# Patient Record
Sex: FEMALE | Race: WHITE | NOT HISPANIC OR LATINO | Employment: FULL TIME | URBAN - METROPOLITAN AREA
[De-identification: names, ages, dates, MRNs, and addresses within clinical notes are randomized per-mention and may not be internally consistent; named-entity substitution may affect disease eponyms.]

---

## 2019-01-01 ENCOUNTER — HOSPITAL ENCOUNTER (OUTPATIENT)
Facility: HOSPITAL | Age: 44
Discharge: HOME | End: 2019-01-04
Attending: FAMILY MEDICINE | Admitting: INTERNAL MEDICINE
Payer: COMMERCIAL

## 2019-01-01 DIAGNOSIS — I48.91 ATRIAL FIBRILLATION: ICD-10-CM

## 2019-01-01 DIAGNOSIS — E87.6 HYPOKALEMIA: ICD-10-CM

## 2019-01-01 DIAGNOSIS — R07.9 CHEST PAIN: ICD-10-CM

## 2019-01-01 DIAGNOSIS — R07.9 CHEST PAIN, UNSPECIFIED TYPE: Primary | ICD-10-CM

## 2019-01-01 LAB
ALBUMIN SERPL BCP-MCNC: 4.3 G/DL
ALP SERPL-CCNC: 94 U/L
ALT SERPL W/O P-5'-P-CCNC: 24 U/L
ANION GAP SERPL CALC-SCNC: 9 MMOL/L
AST SERPL-CCNC: 22 U/L
B-HCG UR QL: NEGATIVE
BASOPHILS # BLD AUTO: 0.06 K/UL
BASOPHILS NFR BLD: 0.7 %
BILIRUB SERPL-MCNC: 0.5 MG/DL
BUN SERPL-MCNC: 18 MG/DL
CALCIUM SERPL-MCNC: 9 MG/DL
CHLORIDE SERPL-SCNC: 101 MMOL/L
CO2 SERPL-SCNC: 25 MMOL/L
CREAT SERPL-MCNC: 1.4 MG/DL
D DIMER PPP FEU-MCNC: 149 NG/ML
DIFFERENTIAL METHOD: NORMAL
EOSINOPHIL # BLD AUTO: 0.2 K/UL
EOSINOPHIL NFR BLD: 2.4 %
ERYTHROCYTE [DISTWIDTH] IN BLOOD BY AUTOMATED COUNT: 12.6 %
EST. GFR  (AFRICAN AMERICAN): 52.7 ML/MIN/1.73 M^2
EST. GFR  (NON AFRICAN AMERICAN): 45.7 ML/MIN/1.73 M^2
GLUCOSE SERPL-MCNC: 123 MG/DL
HCT VFR BLD AUTO: 38.6 %
HGB BLD-MCNC: 12.9 G/DL
IMM GRANULOCYTES # BLD AUTO: 0.02 K/UL
IMM GRANULOCYTES NFR BLD AUTO: 0.2 %
INR PPP: 0.9
LYMPHOCYTES # BLD AUTO: 3.3 K/UL
LYMPHOCYTES NFR BLD: 37.2 %
MCH RBC QN AUTO: 30.4 PG
MCHC RBC AUTO-ENTMCNC: 33.4 G/DL
MCV RBC AUTO: 91 FL
MONOCYTES # BLD AUTO: 0.8 K/UL
MONOCYTES NFR BLD: 8.5 %
NEUTROPHILS # BLD AUTO: 4.6 K/UL
NEUTROPHILS NFR BLD: 51 %
NRBC BLD-RTO: 0 /100 WBC
PLATELET # BLD AUTO: 254 K/UL
PMV BLD AUTO: 10.6 FL
POTASSIUM SERPL-SCNC: 3.3 MMOL/L
PROT SERPL-MCNC: 7.6 G/DL
PROTHROMBIN TIME: 10.5 SEC
RBC # BLD AUTO: 4.25 M/UL
SODIUM SERPL-SCNC: 135 MMOL/L
TROPONIN I SERPL DL<=0.01 NG/ML-MCNC: <0.01 NG/ML
WBC # BLD AUTO: 8.92 K/UL

## 2019-01-01 PROCEDURE — 96361 HYDRATE IV INFUSION ADD-ON: CPT

## 2019-01-01 PROCEDURE — 80053 COMPREHEN METABOLIC PANEL: CPT

## 2019-01-01 PROCEDURE — G0378 HOSPITAL OBSERVATION PER HR: HCPCS

## 2019-01-01 PROCEDURE — 71045 X-RAY EXAM CHEST 1 VIEW: CPT | Mod: 26,,, | Performed by: RADIOLOGY

## 2019-01-01 PROCEDURE — 99285 EMERGENCY DEPT VISIT HI MDM: CPT | Mod: 25

## 2019-01-01 PROCEDURE — 71045 XR CHEST AP PORTABLE: ICD-10-PCS | Mod: 26,,, | Performed by: RADIOLOGY

## 2019-01-01 PROCEDURE — 96374 THER/PROPH/DIAG INJ IV PUSH: CPT

## 2019-01-01 PROCEDURE — 25000003 PHARM REV CODE 250: Performed by: FAMILY MEDICINE

## 2019-01-01 PROCEDURE — 71045 X-RAY EXAM CHEST 1 VIEW: CPT | Mod: TC,FY

## 2019-01-01 PROCEDURE — 63600175 PHARM REV CODE 636 W HCPCS: Performed by: FAMILY MEDICINE

## 2019-01-01 PROCEDURE — 85610 PROTHROMBIN TIME: CPT

## 2019-01-01 PROCEDURE — 81025 URINE PREGNANCY TEST: CPT

## 2019-01-01 PROCEDURE — 84484 ASSAY OF TROPONIN QUANT: CPT

## 2019-01-01 PROCEDURE — 93005 ELECTROCARDIOGRAM TRACING: CPT

## 2019-01-01 PROCEDURE — 85025 COMPLETE CBC W/AUTO DIFF WBC: CPT

## 2019-01-01 PROCEDURE — 85379 FIBRIN DEGRADATION QUANT: CPT

## 2019-01-01 RX ORDER — POTASSIUM CHLORIDE 20 MEQ/1
20 TABLET, EXTENDED RELEASE ORAL
Status: COMPLETED | OUTPATIENT
Start: 2019-01-01 | End: 2019-01-01

## 2019-01-01 RX ORDER — LORAZEPAM 2 MG/ML
1 INJECTION INTRAMUSCULAR
Status: COMPLETED | OUTPATIENT
Start: 2019-01-01 | End: 2019-01-01

## 2019-01-01 RX ORDER — SODIUM CHLORIDE 9 MG/ML
INJECTION, SOLUTION INTRAVENOUS CONTINUOUS
Status: DISCONTINUED | OUTPATIENT
Start: 2019-01-02 | End: 2019-01-04 | Stop reason: HOSPADM

## 2019-01-01 RX ORDER — LISINOPRIL AND HYDROCHLOROTHIAZIDE 10; 12.5 MG/1; MG/1
1 TABLET ORAL DAILY
COMMUNITY
End: 2019-01-16 | Stop reason: SDUPTHER

## 2019-01-01 RX ORDER — LEVOTHYROXINE SODIUM 25 UG/1
125 TABLET ORAL DAILY
Status: DISCONTINUED | OUTPATIENT
Start: 2019-01-02 | End: 2019-01-04 | Stop reason: HOSPADM

## 2019-01-01 RX ORDER — FLUOXETINE 10 MG/1
20 CAPSULE ORAL DAILY
Status: DISCONTINUED | OUTPATIENT
Start: 2019-01-02 | End: 2019-01-04 | Stop reason: HOSPADM

## 2019-01-01 RX ORDER — TEMAZEPAM 15 MG/1
15 CAPSULE ORAL NIGHTLY PRN
COMMUNITY
End: 2019-01-16 | Stop reason: SDUPTHER

## 2019-01-01 RX ORDER — NITROGLYCERIN 0.4 MG/1
0.4 TABLET SUBLINGUAL
Status: COMPLETED | OUTPATIENT
Start: 2019-01-01 | End: 2019-01-01

## 2019-01-01 RX ORDER — POTASSIUM CHLORIDE 20 MEQ/1
20 TABLET, EXTENDED RELEASE ORAL DAILY
Status: DISCONTINUED | OUTPATIENT
Start: 2019-01-02 | End: 2019-01-02

## 2019-01-01 RX ORDER — SODIUM CHLORIDE 9 MG/ML
1000 INJECTION, SOLUTION INTRAVENOUS
Status: COMPLETED | OUTPATIENT
Start: 2019-01-01 | End: 2019-01-01

## 2019-01-01 RX ORDER — FLUOXETINE HYDROCHLORIDE 20 MG/1
20 CAPSULE ORAL DAILY
COMMUNITY
End: 2019-01-16

## 2019-01-01 RX ORDER — MORPHINE SULFATE 4 MG/ML
2 INJECTION, SOLUTION INTRAMUSCULAR; INTRAVENOUS EVERY 4 HOURS PRN
Status: DISCONTINUED | OUTPATIENT
Start: 2019-01-01 | End: 2019-01-04 | Stop reason: HOSPADM

## 2019-01-01 RX ORDER — ATORVASTATIN CALCIUM 20 MG/1
20 TABLET, FILM COATED ORAL DAILY
COMMUNITY
End: 2019-01-16 | Stop reason: SDUPTHER

## 2019-01-01 RX ORDER — ONDANSETRON 2 MG/ML
4 INJECTION INTRAMUSCULAR; INTRAVENOUS EVERY 8 HOURS PRN
Status: DISCONTINUED | OUTPATIENT
Start: 2019-01-01 | End: 2019-01-04 | Stop reason: HOSPADM

## 2019-01-01 RX ORDER — POTASSIUM CHLORIDE 1500 MG/1
20 TABLET, EXTENDED RELEASE ORAL DAILY PRN
COMMUNITY
End: 2020-05-25

## 2019-01-01 RX ORDER — ASPIRIN 81 MG/1
81 TABLET ORAL DAILY
COMMUNITY

## 2019-01-01 RX ORDER — ASPIRIN 325 MG
325 TABLET ORAL
Status: COMPLETED | OUTPATIENT
Start: 2019-01-01 | End: 2019-01-01

## 2019-01-01 RX ORDER — ATORVASTATIN CALCIUM 10 MG/1
20 TABLET, FILM COATED ORAL DAILY
Status: DISCONTINUED | OUTPATIENT
Start: 2019-01-02 | End: 2019-01-04 | Stop reason: HOSPADM

## 2019-01-01 RX ORDER — LEVOTHYROXINE SODIUM 125 UG/1
125 TABLET ORAL DAILY
COMMUNITY
End: 2019-01-16 | Stop reason: SDUPTHER

## 2019-01-01 RX ORDER — ASPIRIN 325 MG
325 TABLET, DELAYED RELEASE (ENTERIC COATED) ORAL DAILY
Status: DISCONTINUED | OUTPATIENT
Start: 2019-01-02 | End: 2019-01-04 | Stop reason: HOSPADM

## 2019-01-01 RX ADMIN — NITROGLYCERIN 0.4 MG: 0.4 TABLET SUBLINGUAL at 09:01

## 2019-01-01 RX ADMIN — POTASSIUM CHLORIDE 20 MEQ: 1500 TABLET, EXTENDED RELEASE ORAL at 10:01

## 2019-01-01 RX ADMIN — NITROGLYCERIN 1 INCH: 20 OINTMENT TOPICAL at 10:01

## 2019-01-01 RX ADMIN — ASPIRIN 325 MG ORAL TABLET 325 MG: 325 PILL ORAL at 10:01

## 2019-01-01 RX ADMIN — LORAZEPAM 1 MG: 2 INJECTION, SOLUTION INTRAMUSCULAR; INTRAVENOUS at 09:01

## 2019-01-01 RX ADMIN — SODIUM CHLORIDE 1000 ML: 9 INJECTION, SOLUTION INTRAVENOUS at 09:01

## 2019-01-02 LAB
TROPONIN I SERPL DL<=0.01 NG/ML-MCNC: <0.01 NG/ML
TSH SERPL DL<=0.005 MIU/L-ACNC: 1.29 UIU/ML

## 2019-01-02 PROCEDURE — 84484 ASSAY OF TROPONIN QUANT: CPT | Mod: 91

## 2019-01-02 PROCEDURE — 84443 ASSAY THYROID STIM HORMONE: CPT

## 2019-01-02 PROCEDURE — 25000003 PHARM REV CODE 250: Performed by: FAMILY MEDICINE

## 2019-01-02 PROCEDURE — 96375 TX/PRO/DX INJ NEW DRUG ADDON: CPT | Mod: 59

## 2019-01-02 PROCEDURE — 93005 ELECTROCARDIOGRAM TRACING: CPT

## 2019-01-02 PROCEDURE — 63600175 PHARM REV CODE 636 W HCPCS: Performed by: FAMILY MEDICINE

## 2019-01-02 PROCEDURE — 96376 TX/PRO/DX INJ SAME DRUG ADON: CPT | Mod: 59

## 2019-01-02 PROCEDURE — 94761 N-INVAS EAR/PLS OXIMETRY MLT: CPT

## 2019-01-02 PROCEDURE — 25000003 PHARM REV CODE 250: Performed by: INTERNAL MEDICINE

## 2019-01-02 PROCEDURE — 36415 COLL VENOUS BLD VENIPUNCTURE: CPT

## 2019-01-02 PROCEDURE — G0378 HOSPITAL OBSERVATION PER HR: HCPCS

## 2019-01-02 RX ORDER — ACETAMINOPHEN 325 MG/1
TABLET ORAL
Status: DISPENSED
Start: 2019-01-02 | End: 2019-01-02

## 2019-01-02 RX ORDER — ACETAMINOPHEN 325 MG/1
650 TABLET ORAL EVERY 6 HOURS PRN
Status: DISCONTINUED | OUTPATIENT
Start: 2019-01-02 | End: 2019-01-04 | Stop reason: HOSPADM

## 2019-01-02 RX ORDER — POTASSIUM CHLORIDE 20 MEQ/1
20 TABLET, EXTENDED RELEASE ORAL 2 TIMES DAILY
Status: DISCONTINUED | OUTPATIENT
Start: 2019-01-02 | End: 2019-01-04 | Stop reason: HOSPADM

## 2019-01-02 RX ADMIN — POTASSIUM CHLORIDE 20 MEQ: 1500 TABLET, EXTENDED RELEASE ORAL at 08:01

## 2019-01-02 RX ADMIN — MORPHINE SULFATE 2 MG: 4 INJECTION, SOLUTION INTRAMUSCULAR; INTRAVENOUS at 03:01

## 2019-01-02 RX ADMIN — ACETAMINOPHEN 650 MG: 325 TABLET ORAL at 08:01

## 2019-01-02 RX ADMIN — FLUOXETINE 20 MG: 10 CAPSULE ORAL at 08:01

## 2019-01-02 RX ADMIN — SODIUM CHLORIDE: 9 INJECTION, SOLUTION INTRAVENOUS at 08:01

## 2019-01-02 RX ADMIN — SODIUM CHLORIDE: 9 INJECTION, SOLUTION INTRAVENOUS at 09:01

## 2019-01-02 RX ADMIN — ACETAMINOPHEN 650 MG: 325 TABLET ORAL at 02:01

## 2019-01-02 RX ADMIN — POTASSIUM CHLORIDE 20 MEQ: 1500 TABLET, EXTENDED RELEASE ORAL at 10:01

## 2019-01-02 RX ADMIN — MORPHINE SULFATE 2 MG: 4 INJECTION, SOLUTION INTRAMUSCULAR; INTRAVENOUS at 08:01

## 2019-01-02 RX ADMIN — LEVOTHYROXINE SODIUM 125 MCG: 25 TABLET ORAL at 09:01

## 2019-01-02 RX ADMIN — NITROGLYCERIN 1 INCH: 20 OINTMENT TOPICAL at 05:01

## 2019-01-02 RX ADMIN — NITROGLYCERIN 1 INCH: 20 OINTMENT TOPICAL at 12:01

## 2019-01-02 RX ADMIN — ATORVASTATIN CALCIUM 20 MG: 10 TABLET, FILM COATED ORAL at 09:01

## 2019-01-02 RX ADMIN — ONDANSETRON 4 MG: 2 INJECTION INTRAMUSCULAR; INTRAVENOUS at 08:01

## 2019-01-02 RX ADMIN — ASPIRIN 325 MG: 325 TABLET, DELAYED RELEASE ORAL at 09:01

## 2019-01-02 RX ADMIN — MORPHINE SULFATE 2 MG: 4 INJECTION, SOLUTION INTRAMUSCULAR; INTRAVENOUS at 09:01

## 2019-01-02 RX ADMIN — ACETAMINOPHEN 650 MG: 325 TABLET ORAL at 12:01

## 2019-01-02 RX ADMIN — SODIUM CHLORIDE 1000 ML: 9 INJECTION, SOLUTION INTRAVENOUS at 12:01

## 2019-01-02 NOTE — HPI
Patient is a 43-year-old female presented to the emergency department last evening complaining of chest pain that started approximately 3 days ago and has been intermittent.  She states that this discomfort started as a tingling of her left arm then a pain in the left arm that a scented to the left chest wall.  She also complains of some shortness of breath associated with this, diaphoresis, nausea, and palpitations.  Patient states she has never had any problem of this nature before.  Patient has several risk factors of positive family history, hypertension, hyperlipidemia, but she does not smoke and is not diabetic at as far she knows.  Patient has a past medical history of hypertension, hyperlipidemia, 1 episode of pancreatitis approximately 2 years ago, and hypothyroidism.  She states the pancreatitis in her history was cause from a blocked bile duct.  Patient still has her gallbladder and has had no other significant surgeries.  Patient is a nonsmoker and has not smoked ever

## 2019-01-02 NOTE — PLAN OF CARE
Problem: Adult Inpatient Plan of Care  Goal: Absence of Hospital-Acquired Illness or Injury    Intervention: Prevent VTE (venous thromboembolism)   01/02/19 0327   Prevent or Manage Embolism   VTE Prevention/Management ROM (active) performed

## 2019-01-02 NOTE — ED NOTES
PT STATES CHEST PAIN TO LEFT UPPER SHOULDER REGION, DESCRIBES PAIN AS CONSTANT & TIGHT. RATES PAIN AT 7/10. PT STATES PAIN STARTED YESTERDAY & ALSO REPORTS TINGLING SENSATION IN LEFT ARM & FINGERS. REPORTS PRIOR TO COMING TO ER, SHE STOOD UP & GOT REALLY DIZZY & THOUGHT SHE WAS GOING TO PASS OUT. PT DOES STATE THAT SHE TOOK A BABY ASPIRIN YESTERDAY & TODAY. HAS HISTORY OF HTN & HIGH CHOLESTEROL & IS COMPLIANT WITH PRESCRIBED MEDICATIONS. NAD NOTED, WILL CONTINUE TO MONITOR PT. JUAN CARRASCO

## 2019-01-02 NOTE — H&P
[facility] Medicine  History & Physical    Patient Name: Razia Avila  MRN: [id number]  Admission Date: 1/1/2019  Attending Physician: Mo Beard MD  Primary Care Provider: Primary Doctor No         Patient information was obtained from patient and ER records.     Subjective:     Principal Problem:Chest pain    Chief Complaint:   Chief Complaint   Patient presents with    Chest Pain        HPI: Patient is a 43-year-old female presented to the emergency department last evening complaining of chest pain that started approximately 3 days ago and has been intermittent.  She states that this discomfort started as a tingling of her left arm then a pain in the left arm that a scented to the left chest wall.  She also complains of some shortness of breath associated with this, diaphoresis, nausea, and palpitations.  Patient states she has never had any problem of this nature before.  Patient has several risk factors of positive family history, hypertension, hyperlipidemia, but she does not smoke and is not diabetic at as far she knows.  Patient has a past medical history of hypertension, hyperlipidemia, 1 episode of pancreatitis approximately 2 years ago, and hypothyroidism.  She states the pancreatitis in her history was cause from a blocked bile duct.  Patient still has her gallbladder and has had no other significant surgeries.  Patient is a nonsmoker and has not smoked ever    Past Medical History:   Diagnosis Date    Hyperlipidemia     Hypertension     Pancreatitis     Thyroid disease        History reviewed. No pertinent surgical history.    Review of patient's allergies indicates:  No Known Allergies    No current facility-administered medications on file prior to encounter.      Current Outpatient Medications on File Prior to Encounter   Medication Sig    aspirin (ECOTRIN) 81 MG EC tablet Take 81 mg by mouth once daily.    atorvastatin (LIPITOR) 20 MG tablet  Take 20 mg by mouth once daily.    FLUoxetine 20 MG capsule Take 20 mg by mouth once daily.    levothyroxine (SYNTHROID) 125 MCG tablet Take 125 mcg by mouth once daily.    lisinopril-hydrochlorothiazide (PRINZIDE,ZESTORETIC) 10-12.5 mg per tablet Take 1 tablet by mouth once daily.    potassium chloride (K-TAB) 20 mEq Take 20 mEq by mouth once.    temazepam (RESTORIL) 15 mg Cap Take 15 mg by mouth nightly as needed.     Family History     Problem Relation (Age of Onset)    Diabetes Brother    Glaucoma Mother    Heart disease Father    Hyperlipidemia Father    Hypertension Father        Tobacco Use    Smoking status: Never Smoker    Smokeless tobacco: Never Used   Substance and Sexual Activity    Alcohol use: Yes     Comment: OCCASIONAL SOCIAL DRINK    Drug use: No    Sexual activity: Yes     Review of Systems   Constitutional: Negative for activity change, appetite change, fatigue and fever.   HENT: Negative for congestion, ear discharge, mouth sores, nosebleeds, rhinorrhea, sinus pressure, sinus pain and tinnitus.    Eyes: Negative.  Negative for pain, redness and itching.   Respiratory: Positive for chest tightness and shortness of breath. Negative for apnea, cough, choking, wheezing and stridor.    Cardiovascular: Positive for chest pain. Negative for palpitations and leg swelling.   Gastrointestinal: Negative for abdominal distention, abdominal pain, anal bleeding, blood in stool, constipation and diarrhea.   Endocrine: Negative.    Genitourinary: Negative for difficulty urinating, flank pain, frequency and urgency.   Musculoskeletal: Negative for arthralgias, back pain, gait problem and myalgias.   Skin: Negative for color change and pallor.   Allergic/Immunologic: Negative.    Neurological: Positive for dizziness, weakness and light-headedness. Negative for facial asymmetry and headaches.   Hematological: Negative for adenopathy. Does not bruise/bleed easily.   Psychiatric/Behavioral: The patient is  nervous/anxious.      Objective:     Vital Signs (Most Recent):  Temp: 97.2 °F (36.2 °C) (01/02/19 0738)  Pulse: 77 (01/02/19 0802)  Resp: 16 (01/02/19 0738)  BP: 131/80 (01/02/19 0738)  SpO2: 97 % (01/02/19 0802) Vital Signs (24h Range):  Temp:  [97.2 °F (36.2 °C)-98.3 °F (36.8 °C)] 97.2 °F (36.2 °C)  Pulse:  [52-78] 77  Resp:  [13-19] 16  SpO2:  [96 %-99 %] 97 %  BP: (102-145)/(57-89) 131/80     Weight: 79.5 kg (175 lb 4.8 oz)  Body mass index is 31.05 kg/m².    Physical Exam   Constitutional: She is oriented to person, place, and time. She appears well-developed and well-nourished.   HENT:   Head: Normocephalic and atraumatic.   Eyes: EOM are normal. Pupils are equal, round, and reactive to light.   Neck: Normal range of motion. Neck supple. No tracheal deviation present. No thyromegaly present.   Cardiovascular: Normal rate, regular rhythm, normal heart sounds and intact distal pulses.   Pulmonary/Chest: Effort normal and breath sounds normal.   Abdominal: Soft. Bowel sounds are normal. She exhibits no distension. There is no tenderness. There is no rebound and no guarding.   Musculoskeletal: Normal range of motion.   Lymphadenopathy:     She has no cervical adenopathy.   Neurological: She is alert and oriented to person, place, and time.   Skin: Skin is warm and dry. Capillary refill takes less than 2 seconds.   Psychiatric: She has a normal mood and affect. Her behavior is normal. Judgment and thought content normal.         CRANIAL NERVES     CN III, IV, VI   Pupils are equal, round, and reactive to light.  Extraocular motions are normal.        Significant Labs:   Recent Lab Results       01/02/19  0847   01/02/19  0300   01/01/19  2239   01/01/19  2100        Immature Granulocytes       0.2     Immature Grans (Abs)       0.02  Comment:  Mild elevation in immature granulocytes is non specific and   can be seen in a variety of conditions including stress response,   acute inflammation, trauma and pregnancy.  Correlation with other   laboratory and clinical findings is essential.       Albumin       4.3     Alkaline Phosphatase       94     ALT       24     Anion Gap       9     AST       22     Baso #       0.06     Basophil%       0.7     Total Bilirubin       0.5  Comment:  For infants and newborns, interpretation of results should be based  on gestational age, weight and in agreement with clinical  observations.  Premature Infant recommended reference ranges:  Up to 24 hours.............<8.0 mg/dL  Up to 48 hours............<12.0 mg/dL  3-5 days..................<15.0 mg/dL  6-29 days.................<15.0 mg/dL       BUN, Bld       18     Calcium       9.0     Chloride       101     CO2       25     Creatinine       1.4     D-Dimer       149  Comment:  The quantitative D-dimer assay should be used as an aid in the   diagnosis of   deep vein thrombosis and pulmonary embolism. The result of the test   should   be evaluated in the context of all the clinical and laboratory data   available. In those instances where the laboratory result does not   agree   with the clinical evaluation, additional tests should be performed   accordingly.       Differential Method       Automated     eGFR if        52.7     eGFR if non        45.7  Comment:  Calculation used to obtain the estimated glomerular filtration  rate (eGFR) is the CKD-EPI equation.        Eos #       0.2     Eosinophil%       2.4     Glucose       123     Gran # (ANC)       4.6     Gran%       51.0     Hematocrit       38.6     Hemoglobin       12.9     Coumadin Monitoring INR       0.9  Comment:  Coumadin Therapy:  2.0 - 3.0 for INR for all indicators except mechanical heart valves  and antiphospholipid syndromes which should use 2.5 - 3.5.       Lymph #       3.3     Lymph%       37.2     MCH       30.4     MCHC       33.4     MCV       91     Mono #       0.8     Mono%       8.5     MPV       10.6     nRBC       0     Platelets        254     Potassium       3.3     Preg Test, Ur     Negative       Total Protein       7.6     Protime       10.5     RBC       4.25     RDW       12.6     Sodium       135     Troponin I <0.01 <0.01   <0.01      <0.01           WBC       8.92         All pertinent labs within the past 24 hours have been reviewed.    Significant Imaging: I have reviewed and interpreted all pertinent imaging results/findings within the past 24 hours.    Assessment/Plan:     * Chest pain    Admit to medical-surgical unit for telemetry monitoring  Draw serial troponin levels  Repeat labs in the a.m. and replete potassium  Schedule transthoracic echocardiogram in the morning  Continue to monitor other risk factors and treat as appropriate.         VTE Risk Mitigation (From admission, onward)        Ordered     IP VTE LOW RISK PATIENT  Once      01/01/19 4411             Mo Beard MD  Department of Hospital Medicine   [facility]

## 2019-01-02 NOTE — ASSESSMENT & PLAN NOTE
Admit to medical-surgical unit for telemetry monitoring  Draw serial troponin levels  Repeat labs in the a.m. and replete potassium  Schedule transthoracic echocardiogram in the morning  Continue to monitor other risk factors and treat as appropriate.

## 2019-01-02 NOTE — SUBJECTIVE & OBJECTIVE
Past Medical History:   Diagnosis Date    Hyperlipidemia     Hypertension     Pancreatitis     Thyroid disease        History reviewed. No pertinent surgical history.    Review of patient's allergies indicates:  No Known Allergies    No current facility-administered medications on file prior to encounter.      Current Outpatient Medications on File Prior to Encounter   Medication Sig    aspirin (ECOTRIN) 81 MG EC tablet Take 81 mg by mouth once daily.    atorvastatin (LIPITOR) 20 MG tablet Take 20 mg by mouth once daily.    FLUoxetine 20 MG capsule Take 20 mg by mouth once daily.    levothyroxine (SYNTHROID) 125 MCG tablet Take 125 mcg by mouth once daily.    lisinopril-hydrochlorothiazide (PRINZIDE,ZESTORETIC) 10-12.5 mg per tablet Take 1 tablet by mouth once daily.    potassium chloride (K-TAB) 20 mEq Take 20 mEq by mouth once.    temazepam (RESTORIL) 15 mg Cap Take 15 mg by mouth nightly as needed.     Family History     Problem Relation (Age of Onset)    Diabetes Brother    Glaucoma Mother    Heart disease Father    Hyperlipidemia Father    Hypertension Father        Tobacco Use    Smoking status: Never Smoker    Smokeless tobacco: Never Used   Substance and Sexual Activity    Alcohol use: Yes     Comment: OCCASIONAL SOCIAL DRINK    Drug use: No    Sexual activity: Yes     Review of Systems   Constitutional: Negative for activity change, appetite change, fatigue and fever.   HENT: Negative for congestion, ear discharge, mouth sores, nosebleeds, rhinorrhea, sinus pressure, sinus pain and tinnitus.    Eyes: Negative.  Negative for pain, redness and itching.   Respiratory: Positive for chest tightness and shortness of breath. Negative for apnea, cough, choking, wheezing and stridor.    Cardiovascular: Positive for chest pain. Negative for palpitations and leg swelling.   Gastrointestinal: Negative for abdominal distention, abdominal pain, anal bleeding, blood in stool, constipation and diarrhea.    Endocrine: Negative.    Genitourinary: Negative for difficulty urinating, flank pain, frequency and urgency.   Musculoskeletal: Negative for arthralgias, back pain, gait problem and myalgias.   Skin: Negative for color change and pallor.   Allergic/Immunologic: Negative.    Neurological: Positive for dizziness, weakness and light-headedness. Negative for facial asymmetry and headaches.   Hematological: Negative for adenopathy. Does not bruise/bleed easily.   Psychiatric/Behavioral: The patient is nervous/anxious.      Objective:     Vital Signs (Most Recent):  Temp: 97.2 °F (36.2 °C) (01/02/19 0738)  Pulse: 77 (01/02/19 0802)  Resp: 16 (01/02/19 0738)  BP: 131/80 (01/02/19 0738)  SpO2: 97 % (01/02/19 0802) Vital Signs (24h Range):  Temp:  [97.2 °F (36.2 °C)-98.3 °F (36.8 °C)] 97.2 °F (36.2 °C)  Pulse:  [52-78] 77  Resp:  [13-19] 16  SpO2:  [96 %-99 %] 97 %  BP: (102-145)/(57-89) 131/80     Weight: 79.5 kg (175 lb 4.8 oz)  Body mass index is 31.05 kg/m².    Physical Exam   Constitutional: She is oriented to person, place, and time. She appears well-developed and well-nourished.   HENT:   Head: Normocephalic and atraumatic.   Eyes: EOM are normal. Pupils are equal, round, and reactive to light.   Neck: Normal range of motion. Neck supple. No tracheal deviation present. No thyromegaly present.   Cardiovascular: Normal rate, regular rhythm, normal heart sounds and intact distal pulses.   Pulmonary/Chest: Effort normal and breath sounds normal.   Abdominal: Soft. Bowel sounds are normal. She exhibits no distension. There is no tenderness. There is no rebound and no guarding.   Musculoskeletal: Normal range of motion.   Lymphadenopathy:     She has no cervical adenopathy.   Neurological: She is alert and oriented to person, place, and time.   Skin: Skin is warm and dry. Capillary refill takes less than 2 seconds.   Psychiatric: She has a normal mood and affect. Her behavior is normal. Judgment and thought content  normal.         CRANIAL NERVES     CN III, IV, VI   Pupils are equal, round, and reactive to light.  Extraocular motions are normal.        Significant Labs:   Recent Lab Results       01/02/19  0847   01/02/19  0300   01/01/19  2239   01/01/19  2100        Immature Granulocytes       0.2     Immature Grans (Abs)       0.02  Comment:  Mild elevation in immature granulocytes is non specific and   can be seen in a variety of conditions including stress response,   acute inflammation, trauma and pregnancy. Correlation with other   laboratory and clinical findings is essential.       Albumin       4.3     Alkaline Phosphatase       94     ALT       24     Anion Gap       9     AST       22     Baso #       0.06     Basophil%       0.7     Total Bilirubin       0.5  Comment:  For infants and newborns, interpretation of results should be based  on gestational age, weight and in agreement with clinical  observations.  Premature Infant recommended reference ranges:  Up to 24 hours.............<8.0 mg/dL  Up to 48 hours............<12.0 mg/dL  3-5 days..................<15.0 mg/dL  6-29 days.................<15.0 mg/dL       BUN, Bld       18     Calcium       9.0     Chloride       101     CO2       25     Creatinine       1.4     D-Dimer       149  Comment:  The quantitative D-dimer assay should be used as an aid in the   diagnosis of   deep vein thrombosis and pulmonary embolism. The result of the test   should   be evaluated in the context of all the clinical and laboratory data   available. In those instances where the laboratory result does not   agree   with the clinical evaluation, additional tests should be performed   accordingly.       Differential Method       Automated     eGFR if        52.7     eGFR if non        45.7  Comment:  Calculation used to obtain the estimated glomerular filtration  rate (eGFR) is the CKD-EPI equation.        Eos #       0.2     Eosinophil%       2.4      Glucose       123     Gran # (ANC)       4.6     Gran%       51.0     Hematocrit       38.6     Hemoglobin       12.9     Coumadin Monitoring INR       0.9  Comment:  Coumadin Therapy:  2.0 - 3.0 for INR for all indicators except mechanical heart valves  and antiphospholipid syndromes which should use 2.5 - 3.5.       Lymph #       3.3     Lymph%       37.2     MCH       30.4     MCHC       33.4     MCV       91     Mono #       0.8     Mono%       8.5     MPV       10.6     nRBC       0     Platelets       254     Potassium       3.3     Preg Test, Ur     Negative       Total Protein       7.6     Protime       10.5     RBC       4.25     RDW       12.6     Sodium       135     Troponin I <0.01 <0.01   <0.01      <0.01           WBC       8.92         All pertinent labs within the past 24 hours have been reviewed.    Significant Imaging: I have reviewed and interpreted all pertinent imaging results/findings within the past 24 hours.

## 2019-01-02 NOTE — PLAN OF CARE
Problem: Pain (Acute Coronary Syndrome)  Goal: Absence of Cardiac-Related Pain  Outcome: Ongoing (interventions implemented as appropriate)  Intervention: Manage Cardiac Pain Symptoms   01/02/19 1420   Manage Acute Chest Pain   Chest Pain Intervention cardiac monitoring continued

## 2019-01-02 NOTE — PLAN OF CARE
Problem: Pain (Acute Coronary Syndrome)  Goal: Absence of Cardiac-Related Pain    Intervention: Manage Cardiac Pain Symptoms   01/02/19 0326   Manage Acute Chest Pain   Chest Pain Intervention cardiac monitoring continued;cardiac monitor placed

## 2019-01-02 NOTE — ED PROVIDER NOTES
Encounter Date: 1/1/2019       History     Chief Complaint   Patient presents with    Chest Pain     43-year-old female presents complaining a day and half of dull left-sided chest pain over the past few hours however the pain is worsened with radiation to the left jaw and the back of the neck through to the shoulder and has been associated with diaphoresis no nausea no cough or hemoptysis, she has not had leg pain she has not experienced similar pain in the past she recently moved here from [facility] she has no known history of smoking but has a history of hyperlipidemia hypertension and a strong family history for premature coronary artery disease          Review of patient's allergies indicates:  No Known Allergies  Past Medical History:   Diagnosis Date    Hyperlipidemia     Hypertension     Thyroid disease      No past surgical history on file.  No family history on file.  Social History     Tobacco Use    Smoking status: Never Smoker    Smokeless tobacco: Never Used   Substance Use Topics    Alcohol use: Yes     Comment: OCCASIONAL SOCIAL DRINK    Drug use: No     Review of Systems   Constitutional: Negative for fever.   HENT: Negative for sore throat.    Respiratory: Negative for shortness of breath.    Cardiovascular: Positive for chest pain.   Gastrointestinal: Negative for nausea.   Genitourinary: Negative for dysuria.   Musculoskeletal: Negative for arthralgias, back pain, gait problem, joint swelling and myalgias.        No leg swelling or tenderness in the calves   Skin: Negative for rash.   Neurological: Negative for weakness.   Hematological: Does not bruise/bleed easily.       Physical Exam     Initial Vitals [01/01/19 2100]   BP Pulse Resp Temp SpO2   (!) 145/89 75 18 98.3 °F (36.8 °C) 99 %      MAP       --         Physical Exam    Nursing note and vitals reviewed.  Constitutional: She appears well-developed and well-nourished. She is not diaphoretic. No distress.   HENT:   Head:  Normocephalic and atraumatic.   Right Ear: External ear normal.   Left Ear: External ear normal.   Eyes: Pupils are equal, round, and reactive to light. Right eye exhibits no discharge. Left eye exhibits no discharge.   Neck: No tracheal deviation present. No JVD present.   Cardiovascular: Exam reveals no friction rub.    No murmur heard.  Pulmonary/Chest: No stridor. No respiratory distress. She has no wheezes. She has no rales.   Abdominal: Bowel sounds are normal. She exhibits no distension.   Musculoskeletal: Normal range of motion. She exhibits no tenderness.   Neurological: She is alert.   Skin: Skin is warm.   Psychiatric: She has a normal mood and affect.         ED Course   Procedures  Labs Reviewed   COMPREHENSIVE METABOLIC PANEL - Abnormal; Notable for the following components:       Result Value    Sodium 135 (*)     Potassium 3.3 (*)     Glucose 123 (*)     eGFR if  52.7 (*)     eGFR if non  45.7 (*)     All other components within normal limits   TROPONIN I - Abnormal; Notable for the following components:    Troponin I <0.01 (*)     All other components within normal limits   CBC W/ AUTO DIFFERENTIAL   PROTIME-INR   D DIMER, QUANTITATIVE   PREGNANCY TEST, URINE RAPID     EKG Readings: (Independently Interpreted)   Initial Reading: No STEMI. Previous EKG: Compared with most recent EKG Rhythm: Normal Sinus Rhythm. Heart Rate: 63. ST Segments: Normal ST Segments. T Waves Flipped: V1 and V2. Axis: Normal.   Other EKG Interpretations: Sec EKG shows no changes still with inverted T-waves V1 V2 no acute ST abnormality       Imaging Results          X-Ray Chest AP Portable (In process)               X-Rays:   Independently Interpreted Readings:   Other Readings:  Chest chest x-ray negative for abnormality or infiltrate    Medical Decision Making:   ED Management:  Patient received over 50% relief after the 2nd nitroglycerin I have discussed with her my recommendation she be  admitted for referral further cardiac evaluation.  Case discussed with Dr. Beard patient will be admitted to telemetry for serial cardiac enzymes                   ED Course as of Jan 01 2243   Tue Jan 01, 2019 2153 Chest pain was down to 3/10 from 7/10 after the 2nd nitroglycerin her post nitroglycerin nausea has dissipated also  [WK]      ED Course User Index  [WK] Mayco Guillory MD     Clinical Impression:   The primary encounter diagnosis was Chest pain, unspecified type. Diagnoses of Chest pain and Hypokalemia were also pertinent to this visit.                             Mayco Guillory MD  01/01/19 1973

## 2019-01-02 NOTE — PLAN OF CARE
Problem: Adult Inpatient Plan of Care  Goal: Readiness for Transition of Care    Intervention: Mutually Develop Transition Plan   01/02/19 1121   OTHER   Communicated expected length of stay with patient/caregiver yes   Is patient able to care for self after discharge? Yes   Who are your caregiver(s) and their phone number(s)? daughter Zaina Kaplan [phone]   Living Environment   Able to Return to Prior Arrangements yes   Discharge Needs Assessment   Readmission Within the Last 30 Days no previous admission in last 30 days   Equipment Currently Used at Home none   Transportation Anticipated public transportation  (Patient states she will be calling [employer])   Social Work Plan   Patient/Family in Agreement with Plan yes   (RETIRED) Current Health   Expected Length of Stay (days) 1   (RETIRED) Social Work Plan   Patient's perception of discharge disposition home or selfcare

## 2019-01-02 NOTE — PLAN OF CARE
Problem: Adult Inpatient Plan of Care  Goal: Plan of Care Review   01/02/19 0328   Plan of Care Review   Plan of Care Reviewed With patient

## 2019-01-02 NOTE — PLAN OF CARE
Problem: Adult Inpatient Plan of Care  Goal: Readiness for Transition of Care  Outcome: Ongoing (interventions implemented as appropriate)  Intervention: Mutually Develop Transition Plan   01/02/19 0329   OTHER   Is patient able to care for self after discharge? Yes   Living Environment   Able to Return to Prior Arrangements yes

## 2019-01-02 NOTE — PLAN OF CARE
Problem: Adult Inpatient Plan of Care  Goal: Optimal Comfort and Wellbeing    Intervention: Provide Person-Centered Care   01/01/19 7826   Support Dyspnea Relief   Trust Relationship/Rapport care explained;emotional support provided

## 2019-01-02 NOTE — PLAN OF CARE
Problem: Adult Inpatient Plan of Care  Goal: Optimal Comfort and Wellbeing    Intervention: Provide Person-Centered Care   01/01/19 4513   Support Dyspnea Relief   Trust Relationship/Rapport care explained;emotional support provided

## 2019-01-02 NOTE — HOSPITAL COURSE
Patient has been admitted to the medical-surgical telemetry unit.  Troponin levels have been negative x3 and the patient's states she still have some tingling of her left arm but no pain noted overnight.    01/03/2019:  Patient states she had some arm pain overnight that radiated to her shoulder on the left.  Otherwise all vital signs have been stable all troponin levels have been normal.  There is nonspecific ECG changes suggestive of possible ischemia although this cannot be corroborated.  Laboratory exams were reviewed and patient will be consulted with Dr. Howard, Cardiologist, to evaluate the need for any further interventions.    01/04/2019:  Patient was subjected to exercise stress testing this morning and no evidence of reversible ischemia.  Patient otherwise be discharged to home in stable condition after stress test and follow up with Dr. Matta is within 1 week post discharge and primary care doctor as needed.

## 2019-01-02 NOTE — NURSING
PT ADMITTED FOR OBSERVATION VIA W/C FROM FERCHO ROBERTS RN PRESENT. PT ORIENTED TO ROOM. PT STATES UNDERSTANDS INSTRUCTIONS. PT FALL PREVENTION AGREEMENT SIGNED.

## 2019-01-02 NOTE — PLAN OF CARE
Problem: Adult Inpatient Plan of Care  Goal: Absence of Hospital-Acquired Illness or Injury    Intervention: Identify and Manage Fall Risk   01/02/19 0500   Optimize Balance and Safe Activity   Safety Promotion/Fall Prevention side rails raised x 2

## 2019-01-02 NOTE — PLAN OF CARE
01/02/19 1121   Discharge Assessment   Assessment Type Discharge Planning Assessment   Confirmed/corrected address and phone number on facesheet? Yes   Assessment information obtained from? Patient   Expected Length of Stay (days) 1   Communicated expected length of stay with patient/caregiver yes   Prior to hospitilization cognitive status: Alert/Oriented   Prior to hospitalization functional status: Independent   Current cognitive status: Alert/Oriented   Current Functional Status: Independent   Lives With child(monique), adult;grandchild(monique)   Able to Return to Prior Arrangements yes   Is patient able to care for self after discharge? Yes   Who are your caregiver(s) and their phone number(s)? pauly Kaplan [phone]   Patient's perception of discharge disposition home or selfcare   Readmission Within the Last 30 Days no previous admission in last 30 days   Patient currently being followed by outpatient case management? No   Patient currently receives any other outside agency services? No   Equipment Currently Used at Home none   Do you have any problems affording any of your prescribed medications? No   Is the patient taking medications as prescribed? yes   Does the patient have transportation home? Yes   Transportation Anticipated public transportation  (Patient states she will be calling [employer])   Discharge Plan A Home   Patient/Family in Agreement with Plan yes

## 2019-01-02 NOTE — PLAN OF CARE
Problem: Adult Inpatient Plan of Care  Goal: Plan of Care Review  Outcome: Ongoing (interventions implemented as appropriate)   01/02/19 1419   Plan of Care Review   Plan of Care Reviewed With patient   Progress improving

## 2019-01-02 NOTE — PLAN OF CARE
01/02/19 1121   Discharge Assessment   Assessment Type Discharge Planning Assessment   Confirmed/corrected address and phone number on facesheet? Yes   Assessment information obtained from? Patient   Expected Length of Stay (days) 1   Communicated expected length of stay with patient/caregiver yes   Prior to hospitilization cognitive status: Alert/Oriented   Prior to hospitalization functional status: Independent   Current cognitive status: Alert/Oriented   Current Functional Status: Independent   Lives With child(monique), adult;grandchild(monique)   Able to Return to Prior Arrangements yes   Is patient able to care for self after discharge? Yes   Who are your caregiver(s) and their phone number(s)? pauly Kaplan [phone]   Patient's perception of discharge disposition home or selfcare   Readmission Within the Last 30 Days no previous admission in last 30 days   Patient currently being followed by outpatient case management? No   Patient currently receives any other outside agency services? No   Equipment Currently Used at Home none   Do you have any problems affording any of your prescribed medications? No   Is the patient taking medications as prescribed? yes   Does the patient have transportation home? Yes   Transportation Anticipated public transportation  (Patient states she will be calling [employer])   Discharge Plan A Home   Patient/Family in Agreement with Plan yes

## 2019-01-03 LAB
ALBUMIN SERPL BCP-MCNC: 3.6 G/DL
ALP SERPL-CCNC: 67 U/L
ALT SERPL W/O P-5'-P-CCNC: 24 U/L
AMYLASE SERPL-CCNC: 28 U/L
ANION GAP SERPL CALC-SCNC: 6 MMOL/L
AORTIC VALVE CUSP SEPERATION: 3 CM
AST SERPL-CCNC: 26 U/L
AV PEAK GRADIENT: 4.84 MMHG
AV VELOCITY RATIO: 0.91
BASOPHILS # BLD AUTO: 0.04 K/UL
BASOPHILS NFR BLD: 0.7 %
BILIRUB SERPL-MCNC: 0.4 MG/DL
BSA FOR ECHO PROCEDURE: 1.88 M2
BUN SERPL-MCNC: 9 MG/DL
CALCIUM SERPL-MCNC: 8 MG/DL
CHLORIDE SERPL-SCNC: 108 MMOL/L
CHOLEST SERPL-MCNC: 161 MG/DL
CHOLEST/HDLC SERPL: 4.7 {RATIO}
CO2 SERPL-SCNC: 23 MMOL/L
CREAT SERPL-MCNC: 0.7 MG/DL
CV ECHO LV RWT: 0.38 CM
DIFFERENTIAL METHOD: ABNORMAL
DOP CALC AO PEAK VEL: 1.1 M/S
DOP CALC LVOT AREA: 3.46 CM2
DOP CALC LVOT DIAMETER: 2.1 CM
DOP CALC LVOT PEAK VEL: 1 M/S
E/A RATIO: 1.67
ECHO LV POSTERIOR WALL: 0.9 CM (ref 0.6–1.1)
EOSINOPHIL # BLD AUTO: 0.2 K/UL
EOSINOPHIL NFR BLD: 2.8 %
ERYTHROCYTE [DISTWIDTH] IN BLOOD BY AUTOMATED COUNT: 12.4 %
EST. GFR  (AFRICAN AMERICAN): >60 ML/MIN/1.73 M^2
EST. GFR  (NON AFRICAN AMERICAN): >60 ML/MIN/1.73 M^2
FRACTIONAL SHORTENING: 34 % (ref 28–44)
GLUCOSE SERPL-MCNC: 98 MG/DL
HCT VFR BLD AUTO: 35.4 %
HDLC SERPL-MCNC: 34 MG/DL
HDLC SERPL: 21.1 %
HGB BLD-MCNC: 11.5 G/DL
IMM GRANULOCYTES # BLD AUTO: 0.02 K/UL
IMM GRANULOCYTES NFR BLD AUTO: 0.4 %
INTERVENTRICULAR SEPTUM: 1 CM (ref 0.6–1.1)
LDLC SERPL CALC-MCNC: 87.4 MG/DL
LEFT ATRIUM SIZE: 3.3 CM
LEFT INTERNAL DIMENSION IN SYSTOLE: 3.1 CM (ref 2.1–4)
LEFT VENTRICLE MASS INDEX: 83.9 G/M2
LEFT VENTRICULAR INTERNAL DIMENSION IN DIASTOLE: 4.7 CM (ref 3.5–6)
LEFT VENTRICULAR MASS: 153.42 G
LIPASE SERPL-CCNC: 21 U/L
LYMPHOCYTES # BLD AUTO: 1.5 K/UL
LYMPHOCYTES NFR BLD: 27 %
MCH RBC QN AUTO: 30.3 PG
MCHC RBC AUTO-ENTMCNC: 32.5 G/DL
MCV RBC AUTO: 93 FL
MONOCYTES # BLD AUTO: 0.5 K/UL
MONOCYTES NFR BLD: 9.1 %
MV PEAK A VEL: 0.55 M/S
MV PEAK E VEL: 0.92 M/S
MV PEAK GRADIENT: 57 MMHG
MV STENOSIS PRESSURE HALF TIME: 69 MS
MV VALVE AREA P 1/2 METHOD: 3.19 CM2
NEUTROPHILS # BLD AUTO: 3.4 K/UL
NEUTROPHILS NFR BLD: 60 %
NONHDLC SERPL-MCNC: 127 MG/DL
NRBC BLD-RTO: 0 /100 WBC
PISA TR MAX VEL: 2.45 M/S
PLATELET # BLD AUTO: 224 K/UL
PMV BLD AUTO: 10.4 FL
POTASSIUM SERPL-SCNC: 4.2 MMOL/L
PROT SERPL-MCNC: 6.3 G/DL
RA PRESSURE: 8 MMHG
RBC # BLD AUTO: 3.8 M/UL
RIGHT VENTRICULAR END-DIASTOLIC DIMENSION: 2.9 CM
SODIUM SERPL-SCNC: 137 MMOL/L
TR MAX PG: 24.01 MMHG
TRIGL SERPL-MCNC: 198 MG/DL
TROPONIN I SERPL DL<=0.01 NG/ML-MCNC: <0.01 NG/ML
TV REST PULMONARY ARTERY PRESSURE: 32 MMHG
WBC # BLD AUTO: 5.7 K/UL

## 2019-01-03 PROCEDURE — 63600175 PHARM REV CODE 636 W HCPCS: Performed by: INTERNAL MEDICINE

## 2019-01-03 PROCEDURE — 80061 LIPID PANEL: CPT

## 2019-01-03 PROCEDURE — 96376 TX/PRO/DX INJ SAME DRUG ADON: CPT

## 2019-01-03 PROCEDURE — 25000003 PHARM REV CODE 250: Performed by: FAMILY MEDICINE

## 2019-01-03 PROCEDURE — C9113 INJ PANTOPRAZOLE SODIUM, VIA: HCPCS | Performed by: INTERNAL MEDICINE

## 2019-01-03 PROCEDURE — G0378 HOSPITAL OBSERVATION PER HR: HCPCS

## 2019-01-03 PROCEDURE — 85025 COMPLETE CBC W/AUTO DIFF WBC: CPT

## 2019-01-03 PROCEDURE — 93005 ELECTROCARDIOGRAM TRACING: CPT

## 2019-01-03 PROCEDURE — 83690 ASSAY OF LIPASE: CPT

## 2019-01-03 PROCEDURE — 82150 ASSAY OF AMYLASE: CPT

## 2019-01-03 PROCEDURE — 80053 COMPREHEN METABOLIC PANEL: CPT

## 2019-01-03 PROCEDURE — 63600175 PHARM REV CODE 636 W HCPCS: Performed by: FAMILY MEDICINE

## 2019-01-03 PROCEDURE — 25500020 PHARM REV CODE 255: Performed by: INTERNAL MEDICINE

## 2019-01-03 PROCEDURE — 96375 TX/PRO/DX INJ NEW DRUG ADDON: CPT

## 2019-01-03 PROCEDURE — 94761 N-INVAS EAR/PLS OXIMETRY MLT: CPT

## 2019-01-03 PROCEDURE — 84484 ASSAY OF TROPONIN QUANT: CPT

## 2019-01-03 PROCEDURE — 25000003 PHARM REV CODE 250: Performed by: INTERNAL MEDICINE

## 2019-01-03 PROCEDURE — 36415 COLL VENOUS BLD VENIPUNCTURE: CPT

## 2019-01-03 RX ORDER — PANTOPRAZOLE SODIUM 40 MG/10ML
40 INJECTION, POWDER, LYOPHILIZED, FOR SOLUTION INTRAVENOUS DAILY
Status: DISCONTINUED | OUTPATIENT
Start: 2019-01-03 | End: 2019-01-04 | Stop reason: HOSPADM

## 2019-01-03 RX ORDER — DOCUSATE SODIUM 100 MG/1
CAPSULE, LIQUID FILLED ORAL
Status: DISPENSED
Start: 2019-01-03 | End: 2019-01-03

## 2019-01-03 RX ORDER — DOCUSATE SODIUM 100 MG/1
100 CAPSULE, LIQUID FILLED ORAL DAILY
Status: DISCONTINUED | OUTPATIENT
Start: 2019-01-03 | End: 2019-01-04 | Stop reason: HOSPADM

## 2019-01-03 RX ADMIN — IOHEXOL 15 ML: 300 INJECTION, SOLUTION INTRAVENOUS at 03:01

## 2019-01-03 RX ADMIN — ACETAMINOPHEN 650 MG: 325 TABLET ORAL at 11:01

## 2019-01-03 RX ADMIN — ASPIRIN 325 MG: 325 TABLET, DELAYED RELEASE ORAL at 08:01

## 2019-01-03 RX ADMIN — POTASSIUM CHLORIDE 20 MEQ: 1500 TABLET, EXTENDED RELEASE ORAL at 08:01

## 2019-01-03 RX ADMIN — PANTOPRAZOLE SODIUM 40 MG: 40 INJECTION, POWDER, LYOPHILIZED, FOR SOLUTION INTRAVENOUS at 11:01

## 2019-01-03 RX ADMIN — NITROGLYCERIN 1 INCH: 20 OINTMENT TOPICAL at 12:01

## 2019-01-03 RX ADMIN — ONDANSETRON 4 MG: 2 INJECTION INTRAMUSCULAR; INTRAVENOUS at 05:01

## 2019-01-03 RX ADMIN — ACETAMINOPHEN 650 MG: 325 TABLET ORAL at 04:01

## 2019-01-03 RX ADMIN — MORPHINE SULFATE 2 MG: 4 INJECTION, SOLUTION INTRAMUSCULAR; INTRAVENOUS at 05:01

## 2019-01-03 RX ADMIN — POTASSIUM CHLORIDE 20 MEQ: 1500 TABLET, EXTENDED RELEASE ORAL at 09:01

## 2019-01-03 RX ADMIN — SODIUM CHLORIDE: 9 INJECTION, SOLUTION INTRAVENOUS at 03:01

## 2019-01-03 RX ADMIN — FLUOXETINE 20 MG: 10 CAPSULE ORAL at 08:01

## 2019-01-03 RX ADMIN — IOHEXOL 15 ML: 300 INJECTION, SOLUTION INTRAVENOUS at 06:01

## 2019-01-03 RX ADMIN — SODIUM CHLORIDE: 9 INJECTION, SOLUTION INTRAVENOUS at 05:01

## 2019-01-03 RX ADMIN — ATORVASTATIN CALCIUM 20 MG: 10 TABLET, FILM COATED ORAL at 08:01

## 2019-01-03 RX ADMIN — DOCUSATE SODIUM 100 MG: 100 CAPSULE, LIQUID FILLED ORAL at 11:01

## 2019-01-03 RX ADMIN — LEVOTHYROXINE SODIUM 125 MCG: 25 TABLET ORAL at 08:01

## 2019-01-03 RX ADMIN — MORPHINE SULFATE 2 MG: 4 INJECTION, SOLUTION INTRAMUSCULAR; INTRAVENOUS at 07:01

## 2019-01-03 RX ADMIN — MORPHINE SULFATE 2 MG: 4 INJECTION, SOLUTION INTRAMUSCULAR; INTRAVENOUS at 01:01

## 2019-01-03 RX ADMIN — IOHEXOL 75 ML: 350 INJECTION, SOLUTION INTRAVENOUS at 06:01

## 2019-01-03 NOTE — NURSING
Patient c/o chest pain with numbness and tingling of left arm and face.  Dr. Beard notified.  EKG ordered, Troponin draw, Morphine and Zofran administered.

## 2019-01-03 NOTE — PLAN OF CARE
Problem: Adult Inpatient Plan of Care  Goal: Plan of Care Review  Outcome: Ongoing (interventions implemented as appropriate)   01/03/19 1011   Plan of Care Review   Plan of Care Reviewed With patient   Progress improving

## 2019-01-03 NOTE — NURSING
Patients reports she in no longer experiencing chest pain, numbness or tingling.  Will continue to monitor.

## 2019-01-03 NOTE — HOSPITAL COURSE
01/03/2019   Chest pain waxing and waning   Preliminary echo EF 55%    For Lexiscan stress    01/04/2019  No chest pain   Lexiscan stress

## 2019-01-03 NOTE — CONSULTS
[facility] - Med Surg  Cardiology  Consult Note    Patient Name: Razia Avila  MRN: [id number]  Admission Date: 1/1/2019  Hospital Length of Stay: 0 days  Code Status: Full Code   Attending Provider: Mo Beard MD   Consulting Provider: Joe Howard MD  Primary Care Physician: Primary Doctor No  Principal Problem:Chest pain    Patient information was obtained from patient and ER records.     Consults  Subjective:     Chief Complaint:  Chest pain waxing waning    HPI:   43-year-old female admitted with chest pain constant last 2 days waxing waning history of hypertension hyperlipidemia pancreatitis hyperthyroid initial enzymes negative for myocardial injury abdominal ultrasound revealing gall sludge no history of smoking non-specific ST changes by EKG admitted for further evaluation    Past Medical History:   Diagnosis Date    Hyperlipidemia     Hypertension     Pancreatitis     Thyroid disease        History reviewed. No pertinent surgical history.    Review of patient's allergies indicates:  No Known Allergies    No current facility-administered medications on file prior to encounter.      Current Outpatient Medications on File Prior to Encounter   Medication Sig    aspirin (ECOTRIN) 81 MG EC tablet Take 81 mg by mouth once daily.    atorvastatin (LIPITOR) 20 MG tablet Take 20 mg by mouth once daily.    FLUoxetine 20 MG capsule Take 20 mg by mouth once daily.    levothyroxine (SYNTHROID) 125 MCG tablet Take 125 mcg by mouth once daily.    lisinopril-hydrochlorothiazide (PRINZIDE,ZESTORETIC) 10-12.5 mg per tablet Take 1 tablet by mouth once daily.    potassium chloride (K-TAB) 20 mEq Take 20 mEq by mouth once.    temazepam (RESTORIL) 15 mg Cap Take 15 mg by mouth nightly as needed.     Family History     Problem Relation (Age of Onset)    Diabetes Brother    Glaucoma Mother    Heart disease Father    Hyperlipidemia Father    Hypertension Father        Tobacco Use    Smoking  status: Never Smoker    Smokeless tobacco: Never Used   Substance and Sexual Activity    Alcohol use: Yes     Comment: OCCASIONAL SOCIAL DRINK    Drug use: No    Sexual activity: Yes     Review of Systems   Cardiovascular: Positive for chest pain.     Objective:     Vital Signs (Most Recent):  Temp: 96.9 °F (36.1 °C) (01/03/19 0730)  Pulse: 67 (01/03/19 0730)  Resp: 16 (01/03/19 0730)  BP: 137/86 (01/03/19 0730)  SpO2: 97 % (01/03/19 0730) Vital Signs (24h Range):  Temp:  [96.7 °F (35.9 °C)-97.8 °F (36.6 °C)] 96.9 °F (36.1 °C)  Pulse:  [67-80] 67  Resp:  [15-18] 16  SpO2:  [94 %-98 %] 97 %  BP: (118-137)/(73-91) 137/86     Weight: 79.5 kg (175 lb 4.8 oz)  Body mass index is 31.05 kg/m².    SpO2: 97 %  O2 Device (Oxygen Therapy): room air      Intake/Output Summary (Last 24 hours) at 1/3/2019 1018  Last data filed at 1/3/2019 0839  Gross per 24 hour   Intake 3691.33 ml   Output --   Net 3691.33 ml       Lines/Drains/Airways     Peripheral Intravenous Line                 Peripheral IV - Single Lumen 01/01/19 2057 Right Antecubital 1 day                Physical Exam   Constitutional: She appears well-developed.   HENT:   Head: Normocephalic.   Eyes: Pupils are equal, round, and reactive to light.   Neck: Neck supple.   Cardiovascular:   Murmur heard.   Systolic murmur is present with a grade of 2/6 at the lower left sternal border.  Pulmonary/Chest: Breath sounds normal.   Abdominal: Soft.   Musculoskeletal: Normal range of motion.   Neurological: She is alert.   Skin: Skin is warm.   Psychiatric: She has a normal mood and affect.       Significant Labs:   CMP   Recent Labs   Lab 01/01/19  2100   *   K 3.3*      CO2 25   *   BUN 18   CREATININE 1.4   CALCIUM 9.0   PROT 7.6   ALBUMIN 4.3   BILITOT 0.5   ALKPHOS 94   AST 22   ALT 24   ANIONGAP 9   ESTGFRAFRICA 52.7*   EGFRNONAA 45.7*    and CBC   Recent Labs   Lab 01/01/19  2100   WBC 8.92   HGB 12.9   HCT 38.6          Significant Imaging:  Echocardiogram: 2D echo with color flow doppler: No results found for this or any previous visit.    Assessment and Plan:     No notes have been filed under this hospital service.  Service: Cardiology      VTE Risk Mitigation (From admission, onward)        Ordered     IP VTE LOW RISK PATIENT  Once      01/01/19 8580          Thank you for your consult. I will follow-up with patient. Please contact us if you have any additional questions.    Joe Howard MD  Cardiology   [facility] - Med Surg

## 2019-01-03 NOTE — PLAN OF CARE
Problem: Pain (Acute Coronary Syndrome)  Goal: Absence of Cardiac-Related Pain    Intervention: Manage Cardiac Pain Symptoms   01/03/19 0024   Manage Acute Chest Pain   Chest Pain Intervention 12-lead ECG obtained;morphine given

## 2019-01-03 NOTE — NURSING
Patient requested to remove the nitro paste applied at midnight.  Stated her head is killing her.  Removed per patient.

## 2019-01-03 NOTE — NURSING
Patient refused Nitro paste due to having a headache.  Pain medicine given as ordered.  CHERRI, RN

## 2019-01-03 NOTE — NURSING
Offered to help patient get a bath today. Patient stated she cleaned up in the restroom earlier today with things she had from from home.  Stated if she does not get discharged today she would shower tonight. Offered warm bathing wipes and patient stated she may get some later.  CHERRI, RN

## 2019-01-03 NOTE — NURSING
Patient complaining of severe headache (unrelieved by Tylenol) and nausea.  Zofran and Morphine administered.  Will continue to monitor.

## 2019-01-03 NOTE — HPI
43-year-old female admitted with chest pain constant last 2 days waxing waning history of hypertension hyperlipidemia pancreatitis hyperthyroid initial enzymes negative for myocardial injury abdominal ultrasound revealing gall sludge no history of smoking non-specific ST changes by EKG admitted for further evaluation

## 2019-01-04 VITALS
HEIGHT: 63 IN | OXYGEN SATURATION: 97 % | DIASTOLIC BLOOD PRESSURE: 98 MMHG | HEART RATE: 88 BPM | SYSTOLIC BLOOD PRESSURE: 141 MMHG | RESPIRATION RATE: 20 BRPM | TEMPERATURE: 97 F | BODY MASS INDEX: 31.01 KG/M2 | WEIGHT: 175 LBS

## 2019-01-04 LAB
ALBUMIN SERPL BCP-MCNC: 3.5 G/DL
ALP SERPL-CCNC: 64 U/L
ALT SERPL W/O P-5'-P-CCNC: 21 U/L
ANION GAP SERPL CALC-SCNC: 5 MMOL/L
AST SERPL-CCNC: 21 U/L
BASOPHILS # BLD AUTO: 0.05 K/UL
BASOPHILS NFR BLD: 0.6 %
BILIRUB SERPL-MCNC: 0.4 MG/DL
BUN SERPL-MCNC: 10 MG/DL
CALCIUM SERPL-MCNC: 8.2 MG/DL
CHLORIDE SERPL-SCNC: 107 MMOL/L
CO2 SERPL-SCNC: 25 MMOL/L
CREAT SERPL-MCNC: 0.7 MG/DL
DIFFERENTIAL METHOD: ABNORMAL
EOSINOPHIL # BLD AUTO: 0.2 K/UL
EOSINOPHIL NFR BLD: 1.9 %
ERYTHROCYTE [DISTWIDTH] IN BLOOD BY AUTOMATED COUNT: 12.3 %
EST. GFR  (AFRICAN AMERICAN): >60 ML/MIN/1.73 M^2
EST. GFR  (NON AFRICAN AMERICAN): >60 ML/MIN/1.73 M^2
GLUCOSE SERPL-MCNC: 105 MG/DL
HCT VFR BLD AUTO: 34.6 %
HGB BLD-MCNC: 11.3 G/DL
IMM GRANULOCYTES # BLD AUTO: 0.02 K/UL
IMM GRANULOCYTES NFR BLD AUTO: 0.3 %
LYMPHOCYTES # BLD AUTO: 2.2 K/UL
LYMPHOCYTES NFR BLD: 27 %
MAGNESIUM SERPL-MCNC: 2.1 MG/DL
MCH RBC QN AUTO: 30.2 PG
MCHC RBC AUTO-ENTMCNC: 32.7 G/DL
MCV RBC AUTO: 93 FL
MONOCYTES # BLD AUTO: 0.5 K/UL
MONOCYTES NFR BLD: 6.6 %
NEUTROPHILS # BLD AUTO: 5.1 K/UL
NEUTROPHILS NFR BLD: 63.6 %
NRBC BLD-RTO: 0 /100 WBC
PLATELET # BLD AUTO: 230 K/UL
PMV BLD AUTO: 10.8 FL
POTASSIUM SERPL-SCNC: 3.9 MMOL/L
PROT SERPL-MCNC: 6.3 G/DL
RBC # BLD AUTO: 3.74 M/UL
SODIUM SERPL-SCNC: 137 MMOL/L
WBC # BLD AUTO: 8 K/UL

## 2019-01-04 PROCEDURE — G0378 HOSPITAL OBSERVATION PER HR: HCPCS

## 2019-01-04 PROCEDURE — 25000003 PHARM REV CODE 250: Performed by: INTERNAL MEDICINE

## 2019-01-04 PROCEDURE — 25000003 PHARM REV CODE 250: Performed by: FAMILY MEDICINE

## 2019-01-04 PROCEDURE — 80053 COMPREHEN METABOLIC PANEL: CPT

## 2019-01-04 PROCEDURE — 94761 N-INVAS EAR/PLS OXIMETRY MLT: CPT

## 2019-01-04 PROCEDURE — 90686 IIV4 VACC NO PRSV 0.5 ML IM: CPT | Performed by: INTERNAL MEDICINE

## 2019-01-04 PROCEDURE — 63600175 PHARM REV CODE 636 W HCPCS: Performed by: FAMILY MEDICINE

## 2019-01-04 PROCEDURE — 96376 TX/PRO/DX INJ SAME DRUG ADON: CPT | Mod: 59

## 2019-01-04 PROCEDURE — 85025 COMPLETE CBC W/AUTO DIFF WBC: CPT

## 2019-01-04 PROCEDURE — C9113 INJ PANTOPRAZOLE SODIUM, VIA: HCPCS | Performed by: INTERNAL MEDICINE

## 2019-01-04 PROCEDURE — 63600175 PHARM REV CODE 636 W HCPCS: Performed by: INTERNAL MEDICINE

## 2019-01-04 PROCEDURE — 83735 ASSAY OF MAGNESIUM: CPT

## 2019-01-04 PROCEDURE — 36415 COLL VENOUS BLD VENIPUNCTURE: CPT

## 2019-01-04 PROCEDURE — 90471 IMMUNIZATION ADMIN: CPT | Performed by: INTERNAL MEDICINE

## 2019-01-04 RX ORDER — HYDROCODONE BITARTRATE AND ACETAMINOPHEN 5; 325 MG/1; MG/1
1 TABLET ORAL EVERY 6 HOURS PRN
Qty: 20 TABLET | Refills: 0 | Status: SHIPPED | OUTPATIENT
Start: 2019-01-04 | End: 2019-01-16

## 2019-01-04 RX ORDER — REGADENOSON 0.08 MG/ML
0.4 INJECTION, SOLUTION INTRAVENOUS ONCE
Status: COMPLETED | OUTPATIENT
Start: 2019-01-04 | End: 2019-01-04

## 2019-01-04 RX ADMIN — ONDANSETRON 4 MG: 2 INJECTION INTRAMUSCULAR; INTRAVENOUS at 12:01

## 2019-01-04 RX ADMIN — SODIUM CHLORIDE: 9 INJECTION, SOLUTION INTRAVENOUS at 12:01

## 2019-01-04 RX ADMIN — FLUOXETINE 20 MG: 10 CAPSULE ORAL at 11:01

## 2019-01-04 RX ADMIN — REGADENOSON 0.4 MG: 0.08 INJECTION, SOLUTION INTRAVENOUS at 09:01

## 2019-01-04 RX ADMIN — MORPHINE SULFATE 2 MG: 4 INJECTION, SOLUTION INTRAMUSCULAR; INTRAVENOUS at 12:01

## 2019-01-04 RX ADMIN — INFLUENZA A VIRUS A/MICHIGAN/45/2015 X-275 (H1N1) ANTIGEN (FORMALDEHYDE INACTIVATED), INFLUENZA A VIRUS A/SINGAPORE/INFIMH-16-0019/2016 IVR-186 (H3N2) ANTIGEN (FORMALDEHYDE INACTIVATED), INFLUENZA B VIRUS B/PHUKET/3073/2013 ANTIGEN (FORMALDEHYDE INACTIVATED), AND INFLUENZA B VIRUS B/MARYLAND/15/2016 BX-69A ANTIGEN (FORMALDEHYDE INACTIVATED) 0.5 ML: 15; 15; 15; 15 INJECTION, SUSPENSION INTRAMUSCULAR at 11:01

## 2019-01-04 RX ADMIN — POTASSIUM CHLORIDE 20 MEQ: 1500 TABLET, EXTENDED RELEASE ORAL at 11:01

## 2019-01-04 RX ADMIN — ASPIRIN 325 MG: 325 TABLET, DELAYED RELEASE ORAL at 11:01

## 2019-01-04 RX ADMIN — LEVOTHYROXINE SODIUM 125 MCG: 25 TABLET ORAL at 11:01

## 2019-01-04 RX ADMIN — PANTOPRAZOLE SODIUM 40 MG: 40 INJECTION, POWDER, LYOPHILIZED, FOR SOLUTION INTRAVENOUS at 11:01

## 2019-01-04 RX ADMIN — ATORVASTATIN CALCIUM 20 MG: 10 TABLET, FILM COATED ORAL at 11:01

## 2019-01-04 RX ADMIN — ACETAMINOPHEN 650 MG: 325 TABLET ORAL at 08:01

## 2019-01-04 NOTE — SUBJECTIVE & OBJECTIVE
Interval History:  Patient continues to have occasional chest pain with left arm numbness.  Will continue to evaluate.  Will order CT of her abdomen due to history of pancreatitis as well    Review of Systems   Constitutional: Negative for activity change, appetite change, fatigue and fever.   HENT: Negative for congestion, ear discharge, mouth sores, nosebleeds, rhinorrhea, sinus pressure, sinus pain and tinnitus.    Eyes: Negative.  Negative for pain, redness and itching.   Respiratory: Negative for apnea, cough, choking, chest tightness, shortness of breath, wheezing and stridor.    Cardiovascular: Positive for chest pain and palpitations. Negative for leg swelling.   Gastrointestinal: Negative for abdominal distention, abdominal pain, anal bleeding, blood in stool, constipation and diarrhea.   Endocrine: Negative.    Genitourinary: Negative for difficulty urinating, flank pain, frequency and urgency.   Musculoskeletal: Negative for arthralgias, back pain, gait problem and myalgias.   Skin: Negative for color change and pallor.   Allergic/Immunologic: Negative.    Neurological: Negative for dizziness, facial asymmetry, weakness, light-headedness and headaches.   Hematological: Negative for adenopathy. Does not bruise/bleed easily.     Objective:     Vital Signs (Most Recent):  Temp: 97.1 °F (36.2 °C) (01/04/19 0723)  Pulse: 66 (01/04/19 0723)  Resp: 18 (01/03/19 2254)  BP: (!) 153/79 (01/04/19 0723)  SpO2: 97 % (01/04/19 0723) Vital Signs (24h Range):  Temp:  [97.1 °F (36.2 °C)-97.7 °F (36.5 °C)] 97.1 °F (36.2 °C)  Pulse:  [66-72] 66  Resp:  [16-18] 18  SpO2:  [95 %-97 %] 97 %  BP: (136-153)/(73-79) 153/79     Weight: 79.5 kg (175 lb 4.8 oz)  Body mass index is 31.05 kg/m².    Intake/Output Summary (Last 24 hours) at 1/4/2019 0918  Last data filed at 1/4/2019 0400  Gross per 24 hour   Intake 1546.67 ml   Output 5 ml   Net 1541.67 ml      Physical Exam   Constitutional: She is oriented to person, place, and time.  She appears well-developed and well-nourished.   HENT:   Head: Normocephalic and atraumatic.   Eyes: EOM are normal. Pupils are equal, round, and reactive to light.   Neck: Normal range of motion. Neck supple. No tracheal deviation present. No thyromegaly present.   Cardiovascular: Normal rate, regular rhythm, normal heart sounds and intact distal pulses.   Pulmonary/Chest: Effort normal and breath sounds normal.   Abdominal: Soft. Bowel sounds are normal. She exhibits no distension. There is no tenderness. There is no rebound and no guarding.   Musculoskeletal: Normal range of motion.   Lymphadenopathy:     She has no cervical adenopathy.   Neurological: She is alert and oriented to person, place, and time.   Skin: Skin is warm and dry. Capillary refill takes less than 2 seconds.   Psychiatric: She has a normal mood and affect. Her behavior is normal. Judgment and thought content normal.   Nursing note and vitals reviewed.      Significant Labs:   Recent Lab Results       01/04/19  0526   01/03/19  1334        Immature Granulocytes 0.3 0.4     Immature Grans (Abs) 0.02  Comment:  Mild elevation in immature granulocytes is non specific and   can be seen in a variety of conditions including stress response,   acute inflammation, trauma and pregnancy. Correlation with other   laboratory and clinical findings is essential.   0.02  Comment:  Mild elevation in immature granulocytes is non specific and   can be seen in a variety of conditions including stress response,   acute inflammation, trauma and pregnancy. Correlation with other   laboratory and clinical findings is essential.       Albumin 3.5 3.6     Alkaline Phosphatase 64 67     ALT 21 24     Anion Gap 5 6     AST 21 26     Baso # 0.05 0.04     Basophil% 0.6 0.7     Total Bilirubin 0.4  Comment:  For infants and newborns, interpretation of results should be based  on gestational age, weight and in agreement with clinical  observations.  Premature Infant  recommended reference ranges:  Up to 24 hours.............<8.0 mg/dL  Up to 48 hours............<12.0 mg/dL  3-5 days..................<15.0 mg/dL  6-29 days.................<15.0 mg/dL   0.4  Comment:  For infants and newborns, interpretation of results should be based  on gestational age, weight and in agreement with clinical  observations.  Premature Infant recommended reference ranges:  Up to 24 hours.............<8.0 mg/dL  Up to 48 hours............<12.0 mg/dL  3-5 days..................<15.0 mg/dL  6-29 days.................<15.0 mg/dL       BUN, Bld 10 9     Calcium 8.2 8.0     Chloride 107 108     CO2 25 23     Creatinine 0.7 0.7     Differential Method Automated Automated     eGFR if  >60.0 >60.0     eGFR if non  >60.0  Comment:  Calculation used to obtain the estimated glomerular filtration  rate (eGFR) is the CKD-EPI equation.    >60.0  Comment:  Calculation used to obtain the estimated glomerular filtration  rate (eGFR) is the CKD-EPI equation.        Eos # 0.2 0.2     Eosinophil% 1.9 2.8     Glucose 105 98     Gran # (ANC) 5.1 3.4     Gran% 63.6 60.0     Hematocrit 34.6 35.4     Hemoglobin 11.3 11.5     Lymph # 2.2 1.5     Lymph% 27.0 27.0     Magnesium 2.1       MCH 30.2 30.3     MCHC 32.7 32.5     MCV 93 93     Mono # 0.5 0.5     Mono% 6.6 9.1     MPV 10.8 10.4     nRBC 0 0     Platelets 230 224     Potassium 3.9 4.2     Total Protein 6.3 6.3     RBC 3.74 3.80     RDW 12.3 12.4     Sodium 137 137     WBC 8.00 5.70         All pertinent labs within the past 24 hours have been reviewed.    Significant Imaging: I have reviewed and interpreted all pertinent imaging results/findings within the past 24 hours.

## 2019-01-04 NOTE — PROGRESS NOTES
[facility] - Med Surg  Cardiology  Progress Note    Patient Name: Razia Avila  MRN: [id number]  Admission Date: 1/1/2019  Hospital Length of Stay: 0 days  Code Status: Full Code   Attending Physician: Mo Beard MD   Primary Care Physician: Primary Doctor No  Expected Discharge Date: 1/4/2019  Principal Problem:Chest pain    Subjective:     Hospital Course:   01/03/2019   Chest pain waxing and waning   Preliminary echo EF 55%    For Lexiscan stress    01/04/2019  No chest pain   Lexiscan stress      Interval History: 43-year-old female admitted with chest pain constant last 2 days waxing waning history of hypertension hyperlipidemia pancreatitis hyperthyroid initial enzymes negative for myocardial injury abdominal ultrasound revealing gall sludge no history of smoking non-specific ST changes by EKG admitted for further evaluation        Review of Systems   Cardiovascular: Positive for chest pain.     Objective:     Vital Signs (Most Recent):  Temp: 97.1 °F (36.2 °C) (01/04/19 0723)  Pulse: 88 (01/04/19 0946)  Resp: 20 (01/04/19 0946)  BP: (!) 141/98 (01/04/19 0946)  SpO2: 97 % (01/04/19 0946) Vital Signs (24h Range):  Temp:  [97.1 °F (36.2 °C)-97.7 °F (36.5 °C)] 97.1 °F (36.2 °C)  Pulse:  [66-88] 88  Resp:  [16-20] 20  SpO2:  [94 %-97 %] 97 %  BP: (136-153)/(73-98) 141/98     Weight: 79.4 kg (175 lb)  Body mass index is 31 kg/m².     SpO2: 97 %  O2 Device (Oxygen Therapy): room air      Intake/Output Summary (Last 24 hours) at 1/4/2019 1040  Last data filed at 1/4/2019 0400  Gross per 24 hour   Intake 1546.67 ml   Output 5 ml   Net 1541.67 ml       Lines/Drains/Airways     Peripheral Intravenous Line                 Peripheral IV - Single Lumen 01/01/19 2057 Right Antecubital 2 days                Physical Exam   Constitutional: She appears well-developed.   HENT:   Head: Normocephalic.   Eyes: Pupils are equal, round, and reactive to light.   Neck: Neck supple.   Cardiovascular:   Murmur  heard.   Systolic murmur is present with a grade of 2/6 at the lower left sternal border.  Pulmonary/Chest: Breath sounds normal.   Abdominal: Soft.   Musculoskeletal: Normal range of motion.   Neurological: She is alert.   Skin: Skin is warm.   Psychiatric: She has a normal mood and affect.       Significant Labs:   CMP   Recent Labs   Lab 01/03/19  1334 01/04/19  0526    137   K 4.2 3.9    107   CO2 23 25   GLU 98 105   BUN 9 10   CREATININE 0.7 0.7   CALCIUM 8.0* 8.2*   PROT 6.3 6.3   ALBUMIN 3.6 3.5   BILITOT 0.4 0.4   ALKPHOS 67 64   AST 26 21   ALT 24 21   ANIONGAP 6* 5*   ESTGFRAFRICA >60.0 >60.0   EGFRNONAA >60.0 >60.0    and CBC   Recent Labs   Lab 01/03/19  1334 01/04/19  0526   WBC 5.70 8.00   HGB 11.5* 11.3*   HCT 35.4* 34.6*    230       Significant Imaging: Echocardiogram: 2D echo with color flow doppler: No results found for this or any previous visit.    Assessment and Plan:     Brief HPI: 43-year-old female admitted with chest pain constant last 2 days waxing waning history of hypertension hyperlipidemia pancreatitis hyperthyroid initial enzymes negative for myocardial injury abdominal ultrasound revealing gall sludge no history of smoking non-specific ST changes by EKG admitted for further evaluation        No notes have been filed under this hospital service.  Service: Cardiology      VTE Risk Mitigation (From admission, onward)        Ordered     IP VTE LOW RISK PATIENT  Once      01/01/19 6394          Joe Howard MD  Cardiology  [facility] - Med Surg

## 2019-01-04 NOTE — NURSING
Stress test complete. Vs stable. continujes to complain of headache. Instructed patient to drink bottled water over 30 minutes wihile waiting in radiology for further nuclear imaging. Verbalized understanding. Pt transferred to imaging via wheelchair by ernesto lr Echo bryan at this time.

## 2019-01-04 NOTE — PLAN OF CARE
01/04/19 0943   Final Note   Assessment Type Final Discharge Note   Anticipated Discharge Disposition Home   What phone number can be called within the next 1-3 days to see how you are doing after discharge? [phone]   Hospital Follow Up  Appt(s) scheduled? Yes   Discharge plans and expectations educations in teach back method with documentation complete? Yes   Cm rounded to advise patient of follow up appt date and time pt verbalized understanding of all information provided

## 2019-01-04 NOTE — NURSING
Patient refused Nitro paste.  States she has no chest pain and it gives her too much of a headache. CHERRI, RN

## 2019-01-04 NOTE — ASSESSMENT & PLAN NOTE
Admit to medical-surgical unit for telemetry monitoring  Draw serial troponin levels  Repeat labs in the a.m. and replete potassium  Schedule transthoracic echocardiogram in the morning  Continue to monitor other risk factors and treat as appropriate.    01/03/2019:  1.  Consult Cardiology with Dr. Howard  2.  Continue current treatment otherwise  3.  Schedule CT of the abdomen and pelvis due to history of pancreatitis  4.  Will treat based on Cardiology consult.

## 2019-01-04 NOTE — PROGRESS NOTES
[facility] Medicine  Progress Note    Patient Name: Razia Avila  MRN: [id number]  Patient Class: OP- Observation   Admission Date: 1/1/2019  Length of Stay: 0 days  Attending Physician: Mo Beard MD  Primary Care Provider: Primary Doctor No        Subjective:     Principal Problem:Chest pain    HPI:  Patient is a 43-year-old female presented to the emergency department last evening complaining of chest pain that started approximately 3 days ago and has been intermittent.  She states that this discomfort started as a tingling of her left arm then a pain in the left arm that a scented to the left chest wall.  She also complains of some shortness of breath associated with this, diaphoresis, nausea, and palpitations.  Patient states she has never had any problem of this nature before.  Patient has several risk factors of positive family history, hypertension, hyperlipidemia, but she does not smoke and is not diabetic at as far she knows.  Patient has a past medical history of hypertension, hyperlipidemia, 1 episode of pancreatitis approximately 2 years ago, and hypothyroidism.  She states the pancreatitis in her history was cause from a blocked bile duct.  Patient still has her gallbladder and has had no other significant surgeries.  Patient is a nonsmoker and has not smoked ever    Hospital Course:  Patient has been admitted to the medical-surgical telemetry unit.  Troponin levels have been negative x3 and the patient's states she still have some tingling of her left arm but no pain noted overnight.    01/03/2019:  Patient states she had some arm pain overnight that radiated to her shoulder on the left.  Otherwise all vital signs have been stable all troponin levels have been normal.  There is nonspecific ECG changes suggestive of possible ischemia although this cannot be corroborated.  Laboratory exams were reviewed and patient will be consulted with Dr. Howard,  Cardiologist, to evaluate the need for any further interventions.    Interval History:  Patient continues to have occasional chest pain with left arm numbness.  Will continue to evaluate.  Will order CT of her abdomen due to history of pancreatitis as well    Review of Systems   Constitutional: Negative for activity change, appetite change, fatigue and fever.   HENT: Negative for congestion, ear discharge, mouth sores, nosebleeds, rhinorrhea, sinus pressure, sinus pain and tinnitus.    Eyes: Negative.  Negative for pain, redness and itching.   Respiratory: Negative for apnea, cough, choking, chest tightness, shortness of breath, wheezing and stridor.    Cardiovascular: Positive for chest pain and palpitations. Negative for leg swelling.   Gastrointestinal: Negative for abdominal distention, abdominal pain, anal bleeding, blood in stool, constipation and diarrhea.   Endocrine: Negative.    Genitourinary: Negative for difficulty urinating, flank pain, frequency and urgency.   Musculoskeletal: Negative for arthralgias, back pain, gait problem and myalgias.   Skin: Negative for color change and pallor.   Allergic/Immunologic: Negative.    Neurological: Negative for dizziness, facial asymmetry, weakness, light-headedness and headaches.   Hematological: Negative for adenopathy. Does not bruise/bleed easily.     Objective:     Vital Signs (Most Recent):  Temp: 97.1 °F (36.2 °C) (01/04/19 0723)  Pulse: 66 (01/04/19 0723)  Resp: 18 (01/03/19 2254)  BP: (!) 153/79 (01/04/19 0723)  SpO2: 97 % (01/04/19 0723) Vital Signs (24h Range):  Temp:  [97.1 °F (36.2 °C)-97.7 °F (36.5 °C)] 97.1 °F (36.2 °C)  Pulse:  [66-72] 66  Resp:  [16-18] 18  SpO2:  [95 %-97 %] 97 %  BP: (136-153)/(73-79) 153/79     Weight: 79.5 kg (175 lb 4.8 oz)  Body mass index is 31.05 kg/m².    Intake/Output Summary (Last 24 hours) at 1/4/2019 0918  Last data filed at 1/4/2019 0400  Gross per 24 hour   Intake 1546.67 ml   Output 5 ml   Net 1541.67 ml       Physical Exam   Constitutional: She is oriented to person, place, and time. She appears well-developed and well-nourished.   HENT:   Head: Normocephalic and atraumatic.   Eyes: EOM are normal. Pupils are equal, round, and reactive to light.   Neck: Normal range of motion. Neck supple. No tracheal deviation present. No thyromegaly present.   Cardiovascular: Normal rate, regular rhythm, normal heart sounds and intact distal pulses.   Pulmonary/Chest: Effort normal and breath sounds normal.   Abdominal: Soft. Bowel sounds are normal. She exhibits no distension. There is no tenderness. There is no rebound and no guarding.   Musculoskeletal: Normal range of motion.   Lymphadenopathy:     She has no cervical adenopathy.   Neurological: She is alert and oriented to person, place, and time.   Skin: Skin is warm and dry. Capillary refill takes less than 2 seconds.   Psychiatric: She has a normal mood and affect. Her behavior is normal. Judgment and thought content normal.   Nursing note and vitals reviewed.      Significant Labs:   Recent Lab Results       01/04/19  0526   01/03/19  1334        Immature Granulocytes 0.3 0.4     Immature Grans (Abs) 0.02  Comment:  Mild elevation in immature granulocytes is non specific and   can be seen in a variety of conditions including stress response,   acute inflammation, trauma and pregnancy. Correlation with other   laboratory and clinical findings is essential.   0.02  Comment:  Mild elevation in immature granulocytes is non specific and   can be seen in a variety of conditions including stress response,   acute inflammation, trauma and pregnancy. Correlation with other   laboratory and clinical findings is essential.       Albumin 3.5 3.6     Alkaline Phosphatase 64 67     ALT 21 24     Anion Gap 5 6     AST 21 26     Baso # 0.05 0.04     Basophil% 0.6 0.7     Total Bilirubin 0.4  Comment:  For infants and newborns, interpretation of results should be based  on gestational age,  weight and in agreement with clinical  observations.  Premature Infant recommended reference ranges:  Up to 24 hours.............<8.0 mg/dL  Up to 48 hours............<12.0 mg/dL  3-5 days..................<15.0 mg/dL  6-29 days.................<15.0 mg/dL   0.4  Comment:  For infants and newborns, interpretation of results should be based  on gestational age, weight and in agreement with clinical  observations.  Premature Infant recommended reference ranges:  Up to 24 hours.............<8.0 mg/dL  Up to 48 hours............<12.0 mg/dL  3-5 days..................<15.0 mg/dL  6-29 days.................<15.0 mg/dL       BUN, Bld 10 9     Calcium 8.2 8.0     Chloride 107 108     CO2 25 23     Creatinine 0.7 0.7     Differential Method Automated Automated     eGFR if  >60.0 >60.0     eGFR if non  >60.0  Comment:  Calculation used to obtain the estimated glomerular filtration  rate (eGFR) is the CKD-EPI equation.    >60.0  Comment:  Calculation used to obtain the estimated glomerular filtration  rate (eGFR) is the CKD-EPI equation.        Eos # 0.2 0.2     Eosinophil% 1.9 2.8     Glucose 105 98     Gran # (ANC) 5.1 3.4     Gran% 63.6 60.0     Hematocrit 34.6 35.4     Hemoglobin 11.3 11.5     Lymph # 2.2 1.5     Lymph% 27.0 27.0     Magnesium 2.1       MCH 30.2 30.3     MCHC 32.7 32.5     MCV 93 93     Mono # 0.5 0.5     Mono% 6.6 9.1     MPV 10.8 10.4     nRBC 0 0     Platelets 230 224     Potassium 3.9 4.2     Total Protein 6.3 6.3     RBC 3.74 3.80     RDW 12.3 12.4     Sodium 137 137     WBC 8.00 5.70         All pertinent labs within the past 24 hours have been reviewed.    Significant Imaging: I have reviewed and interpreted all pertinent imaging results/findings within the past 24 hours.    Assessment/Plan:      * Chest pain    Admit to medical-surgical unit for telemetry monitoring  Draw serial troponin levels  Repeat labs in the a.m. and replete potassium  Schedule transthoracic  echocardiogram in the morning  Continue to monitor other risk factors and treat as appropriate.    01/03/2019:  1.  Consult Cardiology with Dr. Howard  2.  Continue current treatment otherwise  3.  Schedule CT of the abdomen and pelvis due to history of pancreatitis  4.  Will treat based on Cardiology consult.         VTE Risk Mitigation (From admission, onward)        Ordered     IP VTE LOW RISK PATIENT  Once      01/01/19 4870              Mo Beard MD  Department of Hospital Medicine   [facility] - Med Surg

## 2019-01-04 NOTE — NURSING
D/C HOME VIA POV. CONDITION GOOD. D/C INSTRUCTIONS AND RX GIVEN AND VERBALIZED UNDERSTANDING. NO DISTRESS NOTED OR VERBALIZED AT TIME OF D/C.

## 2019-01-05 LAB
OHS CV CPX 85 PERCENT MAX PREDICTED HEART RATE MALE: 142
OHS CV CPX MAX PREDICTED HEART RATE: 167
OHS CV CPX PATIENT IS FEMALE: 1
OHS CV CPX PATIENT IS MALE: 0

## 2019-01-07 NOTE — ASSESSMENT & PLAN NOTE
Admit to medical-surgical unit for telemetry monitoring  Draw serial troponin levels  Repeat labs in the a.m. and replete potassium  Schedule transthoracic echocardiogram in the morning  Continue to monitor other risk factors and treat as appropriate.    01/03/2019:  1.  Consult Cardiology with Dr. Howard  2.  Continue current treatment otherwise  3.  Schedule CT of the abdomen and pelvis due to history of pancreatitis  4.  Will treat based on Cardiology consult.    01/04/2019  1.  Discharge to home  2.  Follow up with primary care physician within 1 week post discharge  3.  Follow-up with .  4.  Patient will be made follow-up appointment with primary care physician prior to discharge.  5.  Will give this patient Norco 10/3251 p.o. q.6 hours p.r.n. for headache 20.  No refill

## 2019-01-07 NOTE — DISCHARGE SUMMARY
[facility]  Hospital Medicine  Discharge Summary      Patient Name: Razia Avila  MRN: [id number]  Admission Date: 1/1/2019  Hospital Length of Stay: 0 days  Discharge Date and Time: 1/4/2019 12:49 PM  Attending Physician: Kathya att. providers found   Discharging Provider: Mo Beard MD  Primary Care Provider: Primary Doctor Kathya      HPI:   Patient is a 43-year-old female presented to the emergency department last evening complaining of chest pain that started approximately 3 days ago and has been intermittent.  She states that this discomfort started as a tingling of her left arm then a pain in the left arm that a scented to the left chest wall.  She also complains of some shortness of breath associated with this, diaphoresis, nausea, and palpitations.  Patient states she has never had any problem of this nature before.  Patient has several risk factors of positive family history, hypertension, hyperlipidemia, but she does not smoke and is not diabetic at as far she knows.  Patient has a past medical history of hypertension, hyperlipidemia, 1 episode of pancreatitis approximately 2 years ago, and hypothyroidism.  She states the pancreatitis in her history was cause from a blocked bile duct.  Patient still has her gallbladder and has had no other significant surgeries.  Patient is a nonsmoker and has not smoked ever    * No surgery found *      Hospital Course:   Patient has been admitted to the medical-surgical telemetry unit.  Troponin levels have been negative x3 and the patient's states she still have some tingling of her left arm but no pain noted overnight.    01/03/2019:  Patient states she had some arm pain overnight that radiated to her shoulder on the left.  Otherwise all vital signs have been stable all troponin levels have been normal.  There is nonspecific ECG changes suggestive of possible ischemia although this cannot be corroborated.  Laboratory exams were reviewed  and patient will be consulted with Dr. Howard, Cardiologist, to evaluate the need for any further interventions.    01/04/2019:  Patient was subjected to exercise stress testing this morning and no evidence of reversible ischemia.  Patient otherwise be discharged to home in stable condition after stress test and follow up with Dr. Matta is within 1 week post discharge and primary care doctor as needed.     Consults:     * Chest pain    Admit to medical-surgical unit for telemetry monitoring  Draw serial troponin levels  Repeat labs in the a.m. and replete potassium  Schedule transthoracic echocardiogram in the morning  Continue to monitor other risk factors and treat as appropriate.    01/03/2019:  1.  Consult Cardiology with Dr. Howard  2.  Continue current treatment otherwise  3.  Schedule CT of the abdomen and pelvis due to history of pancreatitis  4.  Will treat based on Cardiology consult.    01/04/2019  1.  Discharge to home  2.  Follow up with primary care physician within 1 week post discharge  3.  Follow-up with .  4.  Patient will be made follow-up appointment with primary care physician prior to discharge.  5.  Will give this patient Norco 10/3251 p.o. q.6 hours p.r.n. for headache 20.  No refill         Final Active Diagnoses:    Diagnosis Date Noted POA    PRINCIPAL PROBLEM:  Chest pain [R07.9] 01/01/2019 Yes      Problems Resolved During this Admission:       Discharged Condition: good    Disposition: Home or Self Care    Follow Up:  Follow-up Information     Lidya Ramirez NP On 1/16/2019.    Specialty:  Family Medicine  Why:  appt time is 0900  Contact information:  [address] [facility] [facility]  [address] FLO[address]  [facility] [address] [phone]                 Patient Instructions:   No discharge procedures on file.    Significant Diagnostic Studies: Labs: All labs within the past 24 hours have been reviewed    Pending Diagnostic Studies:     Procedure Component Value Units Date/Time    EKG  12-lead [[id number]]     Order Status:  Sent Lab Status:  No result          Medications:  Reconciled Home Medications:      Medication List      START taking these medications    HYDROcodone-acetaminophen 5-325 mg per tablet  Commonly known as:  NORCO  Take 1 tablet by mouth every 6 (six) hours as needed for Pain.        CONTINUE taking these medications    aspirin 81 MG EC tablet  Commonly known as:  ECOTRIN  Take 81 mg by mouth once daily.     atorvastatin 20 MG tablet  Commonly known as:  LIPITOR  Take 20 mg by mouth once daily.     FLUoxetine 20 MG capsule  Take 20 mg by mouth once daily.     levothyroxine 125 MCG tablet  Commonly known as:  SYNTHROID  Take 125 mcg by mouth once daily.     lisinopril-hydrochlorothiazide 10-12.5 mg per tablet  Commonly known as:  PRINZIDE,ZESTORETIC  Take 1 tablet by mouth once daily.     potassium chloride 20 mEq  Commonly known as:  K-TAB  Take 20 mEq by mouth once.     temazepam 15 mg Cap  Commonly known as:  RESTORIL  Take 15 mg by mouth nightly as needed.            Indwelling Lines/Drains at time of discharge:   Lines/Drains/Airways          None          Time spent on the discharge of patient: 30 minutes  Patient was seen and examined on the date of discharge and determined to be suitable for discharge.         Mo Beard MD  Department of Hospital Medicine  [facility] - Med Surg

## 2019-01-16 ENCOUNTER — OFFICE VISIT (OUTPATIENT)
Dept: FAMILY MEDICINE | Facility: CLINIC | Age: 44
End: 2019-01-16
Payer: COMMERCIAL

## 2019-01-16 VITALS
HEIGHT: 63 IN | BODY MASS INDEX: 30.3 KG/M2 | SYSTOLIC BLOOD PRESSURE: 114 MMHG | TEMPERATURE: 99 F | HEART RATE: 64 BPM | WEIGHT: 171 LBS | DIASTOLIC BLOOD PRESSURE: 64 MMHG

## 2019-01-16 DIAGNOSIS — E78.5 HYPERLIPIDEMIA, UNSPECIFIED HYPERLIPIDEMIA TYPE: ICD-10-CM

## 2019-01-16 DIAGNOSIS — E03.9 HYPOTHYROIDISM, UNSPECIFIED TYPE: ICD-10-CM

## 2019-01-16 DIAGNOSIS — E87.6 HYPOKALEMIA: ICD-10-CM

## 2019-01-16 DIAGNOSIS — I10 HYPERTENSION, UNSPECIFIED TYPE: ICD-10-CM

## 2019-01-16 DIAGNOSIS — B96.89 BV (BACTERIAL VAGINOSIS): Primary | ICD-10-CM

## 2019-01-16 DIAGNOSIS — N76.0 BV (BACTERIAL VAGINOSIS): Primary | ICD-10-CM

## 2019-01-16 PROCEDURE — 99204 OFFICE O/P NEW MOD 45 MIN: CPT | Mod: S$GLB,,, | Performed by: NURSE PRACTITIONER

## 2019-01-16 PROCEDURE — 3074F SYST BP LT 130 MM HG: CPT | Mod: CPTII,S$GLB,, | Performed by: NURSE PRACTITIONER

## 2019-01-16 PROCEDURE — 3008F BODY MASS INDEX DOCD: CPT | Mod: CPTII,S$GLB,, | Performed by: NURSE PRACTITIONER

## 2019-01-16 PROCEDURE — 3078F PR MOST RECENT DIASTOLIC BLOOD PRESSURE < 80 MM HG: ICD-10-PCS | Mod: CPTII,S$GLB,, | Performed by: NURSE PRACTITIONER

## 2019-01-16 PROCEDURE — 99204 PR OFFICE/OUTPT VISIT, NEW, LEVL IV, 45-59 MIN: ICD-10-PCS | Mod: S$GLB,,, | Performed by: NURSE PRACTITIONER

## 2019-01-16 PROCEDURE — 3078F DIAST BP <80 MM HG: CPT | Mod: CPTII,S$GLB,, | Performed by: NURSE PRACTITIONER

## 2019-01-16 PROCEDURE — 3008F PR BODY MASS INDEX (BMI) DOCUMENTED: ICD-10-PCS | Mod: CPTII,S$GLB,, | Performed by: NURSE PRACTITIONER

## 2019-01-16 PROCEDURE — 3074F PR MOST RECENT SYSTOLIC BLOOD PRESSURE < 130 MM HG: ICD-10-PCS | Mod: CPTII,S$GLB,, | Performed by: NURSE PRACTITIONER

## 2019-01-16 RX ORDER — LISINOPRIL AND HYDROCHLOROTHIAZIDE 10; 12.5 MG/1; MG/1
1 TABLET ORAL DAILY
Qty: 90 TABLET | Refills: 1 | Status: SHIPPED | OUTPATIENT
Start: 2019-01-16 | End: 2019-08-21 | Stop reason: SDUPTHER

## 2019-01-16 RX ORDER — TEMAZEPAM 15 MG/1
15 CAPSULE ORAL NIGHTLY PRN
Qty: 30 CAPSULE | Refills: 5 | Status: SHIPPED | OUTPATIENT
Start: 2019-01-16 | End: 2020-07-09 | Stop reason: SDUPTHER

## 2019-01-16 RX ORDER — LEVONORGESTREL AND ETHINYL ESTRADIOL 0.15-0.03
1 KIT ORAL DAILY
Qty: 90 TABLET | Refills: 1 | Status: SHIPPED | OUTPATIENT
Start: 2019-01-16 | End: 2019-02-05 | Stop reason: SDUPTHER

## 2019-01-16 RX ORDER — LEVONORGESTREL AND ETHINYL ESTRADIOL 0.15-0.03
1 KIT ORAL DAILY
COMMUNITY
End: 2019-01-16 | Stop reason: SDUPTHER

## 2019-01-16 RX ORDER — FLUOXETINE HYDROCHLORIDE 20 MG/1
20 CAPSULE ORAL DAILY
Qty: 90 CAPSULE | Refills: 1 | Status: SHIPPED | OUTPATIENT
Start: 2019-01-16 | End: 2019-01-16

## 2019-01-16 RX ORDER — FLUOXETINE 10 MG/1
10 CAPSULE ORAL DAILY
Qty: 90 CAPSULE | Refills: 1 | Status: SHIPPED | OUTPATIENT
Start: 2019-01-16 | End: 2019-07-17

## 2019-01-16 RX ORDER — METRONIDAZOLE 500 MG/1
500 TABLET ORAL EVERY 8 HOURS
Qty: 21 TABLET | Refills: 0 | Status: SHIPPED | OUTPATIENT
Start: 2019-01-16 | End: 2019-01-23

## 2019-01-16 RX ORDER — ATORVASTATIN CALCIUM 20 MG/1
20 TABLET, FILM COATED ORAL DAILY
Qty: 90 TABLET | Refills: 1 | Status: SHIPPED | OUTPATIENT
Start: 2019-01-16 | End: 2019-08-21 | Stop reason: SDUPTHER

## 2019-01-16 RX ORDER — LEVOTHYROXINE SODIUM 125 UG/1
125 TABLET ORAL DAILY
Qty: 90 TABLET | Refills: 1 | Status: SHIPPED | OUTPATIENT
Start: 2019-01-16 | End: 2019-05-23 | Stop reason: SDUPTHER

## 2019-01-16 NOTE — PROGRESS NOTES
Subjective:       Patient ID: Razia Avila is a 43 y.o. female.    Chief Complaint: Follow-up    44 y/o female new patient presents as a hospital follow up. PMH: HTN, HLP and hypothyroidism. She reports being hospitalized from 1/1 to 1/4/19 for chest pain, HA, lightheadedness and SOB diagnosed with no significant diagnosis. She was told she had sludge in her gull bladder, HTN and low potassium. Labs and imaging viewed in system. She reports h/o BV with current symptoms of discomfort. She was started on prozac about 2 months ago with c/o feeling like she is calmer yet in a globe to where she just does not care thus discussed decreasing the dose. We discussed at follow up we can do a healthy you visit with labs, pap done 2017 and last mammo 2017.  She currently denies SOB, CP, HA, malaise, fever or chills      Review of Systems   Constitutional: Negative for chills, diaphoresis, fever and unexpected weight change.   HENT: Negative for dental problem and trouble swallowing.    Eyes: Negative for visual disturbance.   Respiratory: Negative for shortness of breath and wheezing.    Cardiovascular: Negative for chest pain and palpitations.   Gastrointestinal: Negative for abdominal pain, constipation, diarrhea, nausea and vomiting.   Endocrine: Negative for polydipsia and polyuria.   Genitourinary: Negative for dysuria.   Musculoskeletal: Negative for joint swelling.   Skin: Negative for rash.   Neurological: Negative for syncope and headaches.   Psychiatric/Behavioral: Negative for agitation and confusion.       Objective:      Physical Exam   Constitutional: She is oriented to person, place, and time. She appears well-developed and well-nourished.   HENT:   Head: Normocephalic.   Eyes: Pupils are equal, round, and reactive to light.   Neck: Normal range of motion. Neck supple.   Cardiovascular: Normal rate, regular rhythm and normal heart sounds.   Pulmonary/Chest: Effort normal and breath sounds normal.   Abdominal:  Soft. Bowel sounds are normal.   Musculoskeletal: Normal range of motion.   Neurological: She is alert and oriented to person, place, and time.   Skin: Skin is warm and dry. Capillary refill takes less than 2 seconds.   Psychiatric: She has a normal mood and affect. Judgment and thought content normal.   Vitals reviewed.      Assessment:       1. BV (bacterial vaginosis)    2. Hypertension, unspecified type    3. Hypothyroidism, unspecified type    4. Hyperlipidemia, unspecified hyperlipidemia type    5. Hypokalemia        Plan:       1- medications refilled  2- RTC in 6 weeks, for wellness visit and fasting labs  3- decrease prozac to 10mg daily

## 2019-02-05 RX ORDER — LEVONORGESTREL AND ETHINYL ESTRADIOL 0.15-0.03
1 KIT ORAL DAILY
Qty: 90 TABLET | Refills: 1 | Status: SHIPPED | OUTPATIENT
Start: 2019-02-05 | End: 2019-08-23 | Stop reason: SDUPTHER

## 2019-02-05 NOTE — TELEPHONE ENCOUNTER
Pt notified via PP      Pt request refill.  Please advise.  Thank you,  Jovana      ----- Message from Sharda Jj RT sent at 2/5/2019  9:54 AM CST -----  Contact: pt    pt , requesting medication refill: Birth control, please call to confirm, thanks.

## 2019-02-06 ENCOUNTER — OFFICE VISIT (OUTPATIENT)
Dept: FAMILY MEDICINE | Facility: CLINIC | Age: 44
End: 2019-02-06
Payer: COMMERCIAL

## 2019-02-06 ENCOUNTER — LAB VISIT (OUTPATIENT)
Dept: LAB | Facility: HOSPITAL | Age: 44
End: 2019-02-06
Attending: NURSE PRACTITIONER
Payer: COMMERCIAL

## 2019-02-06 ENCOUNTER — TELEPHONE (OUTPATIENT)
Dept: FAMILY MEDICINE | Facility: CLINIC | Age: 44
End: 2019-02-06

## 2019-02-06 VITALS
WEIGHT: 168.38 LBS | DIASTOLIC BLOOD PRESSURE: 83 MMHG | BODY MASS INDEX: 29.84 KG/M2 | RESPIRATION RATE: 16 BRPM | TEMPERATURE: 98 F | SYSTOLIC BLOOD PRESSURE: 133 MMHG | HEIGHT: 63 IN | HEART RATE: 73 BPM

## 2019-02-06 DIAGNOSIS — Z11.3 SCREEN FOR STD (SEXUALLY TRANSMITTED DISEASE): ICD-10-CM

## 2019-02-06 DIAGNOSIS — R11.0 NAUSEA: ICD-10-CM

## 2019-02-06 DIAGNOSIS — T14.8XXA BLISTER: ICD-10-CM

## 2019-02-06 DIAGNOSIS — Z01.419 WELL WOMAN EXAM WITH ROUTINE GYNECOLOGICAL EXAM: ICD-10-CM

## 2019-02-06 DIAGNOSIS — R10.11 RUQ PAIN: ICD-10-CM

## 2019-02-06 DIAGNOSIS — R14.0 ABDOMINAL DISTENSION: ICD-10-CM

## 2019-02-06 DIAGNOSIS — K81.1 CHOLECYSTITIS, CHRONIC: ICD-10-CM

## 2019-02-06 DIAGNOSIS — Z01.419 WELL WOMAN EXAM WITH ROUTINE GYNECOLOGICAL EXAM: Primary | ICD-10-CM

## 2019-02-06 LAB
BILIRUB SERPL-MCNC: NEGATIVE MG/DL
BLOOD URINE, POC: ABNORMAL
COLOR, POC UA: ABNORMAL
GLUCOSE UR QL STRIP: NORMAL
KETONES UR QL STRIP: 40
LEUKOCYTE ESTERASE URINE, POC: NEGATIVE
NITRITE, POC UA: NEGATIVE
PH, POC UA: 5
PROTEIN, POC: ABNORMAL
SPECIFIC GRAVITY, POC UA: 1.02
UROBILINOGEN, POC UA: NORMAL

## 2019-02-06 PROCEDURE — 86696 HERPES SIMPLEX TYPE 2 TEST: CPT

## 2019-02-06 PROCEDURE — 99396 PREV VISIT EST AGE 40-64: CPT | Mod: S$GLB,,, | Performed by: NURSE PRACTITIONER

## 2019-02-06 PROCEDURE — 81001 POCT URINALYSIS, DIPSTICK OR TABLET REAGENT, AUTOMATED, WITH MICROSCOP: ICD-10-PCS | Mod: S$GLB,,, | Performed by: NURSE PRACTITIONER

## 2019-02-06 PROCEDURE — 99396 PR PREVENTIVE VISIT,EST,40-64: ICD-10-PCS | Mod: S$GLB,,, | Performed by: NURSE PRACTITIONER

## 2019-02-06 PROCEDURE — 3075F PR MOST RECENT SYSTOLIC BLOOD PRESS GE 130-139MM HG: ICD-10-PCS | Mod: CPTII,S$GLB,, | Performed by: NURSE PRACTITIONER

## 2019-02-06 PROCEDURE — 3075F SYST BP GE 130 - 139MM HG: CPT | Mod: CPTII,S$GLB,, | Performed by: NURSE PRACTITIONER

## 2019-02-06 PROCEDURE — 81001 URINALYSIS AUTO W/SCOPE: CPT | Mod: S$GLB,,, | Performed by: NURSE PRACTITIONER

## 2019-02-06 PROCEDURE — 3008F BODY MASS INDEX DOCD: CPT | Mod: CPTII,S$GLB,, | Performed by: NURSE PRACTITIONER

## 2019-02-06 PROCEDURE — 86694 HERPES SIMPLEX NES ANTBDY: CPT

## 2019-02-06 PROCEDURE — 88175 CYTOPATH C/V AUTO FLUID REDO: CPT

## 2019-02-06 PROCEDURE — 3079F PR MOST RECENT DIASTOLIC BLOOD PRESSURE 80-89 MM HG: ICD-10-PCS | Mod: CPTII,S$GLB,, | Performed by: NURSE PRACTITIONER

## 2019-02-06 PROCEDURE — 3008F PR BODY MASS INDEX (BMI) DOCUMENTED: ICD-10-PCS | Mod: CPTII,S$GLB,, | Performed by: NURSE PRACTITIONER

## 2019-02-06 PROCEDURE — 36415 COLL VENOUS BLD VENIPUNCTURE: CPT

## 2019-02-06 PROCEDURE — 99213 OFFICE O/P EST LOW 20 MIN: CPT | Mod: 25,S$GLB,, | Performed by: NURSE PRACTITIONER

## 2019-02-06 PROCEDURE — 3079F DIAST BP 80-89 MM HG: CPT | Mod: CPTII,S$GLB,, | Performed by: NURSE PRACTITIONER

## 2019-02-06 PROCEDURE — 99213 PR OFFICE/OUTPT VISIT, EST, LEVL III, 20-29 MIN: ICD-10-PCS | Mod: 25,S$GLB,, | Performed by: NURSE PRACTITIONER

## 2019-02-06 RX ORDER — PSEUDOEPHEDRINE HCL 30 MG
30 TABLET ORAL EVERY 4 HOURS PRN
Qty: 60 TABLET | Refills: 0 | Status: SHIPPED | OUTPATIENT
Start: 2019-02-06 | End: 2019-02-16

## 2019-02-06 NOTE — PATIENT INSTRUCTIONS
Cholecystitis (Presumed)    Your abdominal pain may be due to an inflammation and possible infection in the gallbladder. This is called cholecystitis. The gallbladder is a small sac under the liver, which stores and releases bile. Bile is a fluid that aids in the digestion of fat. Eating fatty food stimulates the gallbladder to contract, and release the bile. A gallstone may form in this sac. Although most people do not have symptoms, when the stone moves and blocks the passage of bile out of the bladder, it can cause pain and even an infection. Bile sludge without a stone can also cause cholecystitis.  To be more certain of the diagnosis, you may need to have an ultrasound, CT-scan or other special test.  A number of things increase the risk of developing gallstones:  · Women are more likely to have the problem  · Obesity  · Increasing age  · Losing or gaining weight quickly  · High calorie diet  · Pregnancy  · Hormone therapy  · Diabetes  The most common symptoms are:  · Abdominal pain, cramping, aching  · Nausea, vomiting  · Fever  Many illnesses can cause these symptoms. This pain usually starts in the upper right side of your abdomen. Sometimes it can radiate to your right shoulder, back and arm. It usually starts suddenly, becomes more intense quickly, and then gradually decreases and disappears over a couple of hours. Elderly people and diabetics may have difficulty showing where the pain is exactly.  Home care  · Rest in bed and follow a clear liquid diet until the pain, nausea and vomiting go away.  · Antibiotics and other medicine may be prescribed. Take these exactly as directed.  · You can take acetaminophen or ibuprofen for pain, unless you were given a different pain medicine to use. Note: If you have chronic liver or kidney disease or ever had a stomach ulcer or GI bleeding or are taking blood thinner medications, talk with your doctor before using these medicines.  · Fat in your diet makes the  gallbladder contract and may cause increased pain. Therefore, avoid fat in your diet over the next two days and follow a low-fat diet thereafter. If you are overweight, a low fat diet will help you lose weight.  Follow-up care  An infection in the gallbladder is a serious problem and must be watched carefully. Keep any appointments made for you to have further testing. See your doctor for another exam in the next 1-2 days, or as directed by our staff. Once cholecystitis has occurred, removal of the gallbladder is usually required to prevent a recurrence. You can discuss this at your follow-up visit.  When to seek medical advice  Call your healthcare provider if any of the following occur:  · Repeated vomiting  · Swelling of the abdomen  · Pain lasts over 6 hours  · Fever of 100.4°F (38ºC) or higher, or as directed by your healthcare provider  · Weakness, dizziness or fainting  · Dark urine or light colored stools  · Yellow color of the skin or eyes  · Chest, arm, back, neck or jaw pain  Date Last Reviewed: 8/23/2015 © 2000-2017 [employer]. [address] [place] [address], [place] [address]. All rights reserved. This information is not intended as a substitute for professional medical care. Always follow your healthcare professional's instructions.

## 2019-02-06 NOTE — PROGRESS NOTES
Subjective:       Patient ID: Razia Avila is a 43 y.o. female.    Chief Complaint: Flank Pain (R SIDE X2D) and Urinary Tract Infection    42 y/o female presents with c/o RUQ pain on and off for 3 months with acute excerbated over the past 2 days with associated N/V, abd distension, pain with eating and feeling like she has a mass in her epigastric region. CT Abd and US Abd viewed with gull stones noted other wise WNL. She denies fever and chills. She request pap with STD screening. She reports having a sore on her right labia for a few days with no prior h/o.       Review of Systems   Constitutional: Negative for chills, diaphoresis, fever and unexpected weight change.   HENT: Negative for dental problem and trouble swallowing.    Eyes: Negative for visual disturbance.   Respiratory: Negative for shortness of breath and wheezing.    Cardiovascular: Negative for chest pain and palpitations.   Gastrointestinal: Positive for abdominal distention, abdominal pain, diarrhea, nausea and vomiting. Negative for constipation.   Endocrine: Negative for polydipsia and polyuria.   Genitourinary: Negative for dysuria.   Musculoskeletal: Negative for joint swelling.   Skin: Negative for rash.   Neurological: Negative for syncope and headaches.   Psychiatric/Behavioral: Negative for agitation and confusion.       Objective:      Physical Exam   Constitutional: She is oriented to person, place, and time. She appears well-developed and well-nourished.   HENT:   Head: Normocephalic.   Eyes: Pupils are equal, round, and reactive to light.   Neck: Normal range of motion. Neck supple.   Cardiovascular: Normal rate, regular rhythm and normal heart sounds.   Pulmonary/Chest: Effort normal and breath sounds normal.   Abdominal: Soft. Bowel sounds are normal. She exhibits distension. She exhibits no mass. There is tenderness.   Genitourinary: Vagina normal and uterus normal.   Genitourinary Comments: No CMT   Musculoskeletal: Normal range  of motion.   Neurological: She is alert and oriented to person, place, and time.   Skin: Skin is warm and dry. Capillary refill takes less than 2 seconds.   Psychiatric: She has a normal mood and affect. Judgment and thought content normal.   Vitals reviewed.      Assessment:       1. Well woman exam with routine gynecological exam    2. Cholecystitis, chronic    3. RUQ pain    4. Nausea    5. Abdominal distension    6. Blister    7. Screen for STD (sexually transmitted disease)        Plan:       1- refer to general surgery  2- fasting labs  3- mammo ordered

## 2019-02-06 NOTE — TELEPHONE ENCOUNTER
Excuse sent to PP      ----- Message from Fidelia Gallo sent at 2/6/2019  3:50 PM CST -----  Type: Needs Medical Advice    Who Called:  Patient   Best Call Back Number: [phone]  Additional Information: patient is requesting a work excuse for today 02/06/19 sent to her my [facility] chart.     Thank you

## 2019-02-07 ENCOUNTER — PATIENT MESSAGE (OUTPATIENT)
Dept: FAMILY MEDICINE | Facility: CLINIC | Age: 44
End: 2019-02-07

## 2019-02-08 LAB
HSV AB, IGM BY IFA: NEGATIVE
HSV1 IGG SERPL QL IA: NEGATIVE
HSV2 IGG SERPL QL IA: POSITIVE

## 2019-02-11 ENCOUNTER — TELEPHONE (OUTPATIENT)
Dept: SURGERY | Facility: CLINIC | Age: 44
End: 2019-02-11

## 2019-02-11 NOTE — TELEPHONE ENCOUNTER
----- Message from Valerie Higuera sent at 2/11/2019 12:34 PM CST -----  Contact: Patient  Type:  Sooner Apoointment Request    Caller is requesting a sooner appointment.  Caller declined first available appointment listed below.  Caller will not accept being placed on the waitlist and is requesting a message be sent to doctor.    Name of Caller:  Razia  When is the first available appointment?  2/14/19  Symptoms: gallbladder problem  Best Call Back Number:    Additional Information:  Patient is calling to see if she can move her appointment for a sooner date.Please call back and advise.

## 2019-02-11 NOTE — TELEPHONE ENCOUNTER
Writer spoke to pt and let her know that Dr Rowley did not have any new patient appt available for a sooner appt. Pt expressed verbal understanding and will be in on 02/14/19.

## 2019-02-12 ENCOUNTER — OFFICE VISIT (OUTPATIENT)
Dept: FAMILY MEDICINE | Facility: CLINIC | Age: 44
End: 2019-02-12
Payer: COMMERCIAL

## 2019-02-12 ENCOUNTER — PATIENT MESSAGE (OUTPATIENT)
Dept: FAMILY MEDICINE | Facility: CLINIC | Age: 44
End: 2019-02-12

## 2019-02-12 VITALS
TEMPERATURE: 99 F | WEIGHT: 166 LBS | OXYGEN SATURATION: 99 % | BODY MASS INDEX: 29.41 KG/M2 | SYSTOLIC BLOOD PRESSURE: 122 MMHG | HEART RATE: 76 BPM | DIASTOLIC BLOOD PRESSURE: 78 MMHG | HEIGHT: 63 IN

## 2019-02-12 DIAGNOSIS — Z20.2 EXPOSURE TO STD: ICD-10-CM

## 2019-02-12 DIAGNOSIS — R11.0 NAUSEA: Primary | ICD-10-CM

## 2019-02-12 DIAGNOSIS — R76.8 HSV-2 SEROPOSITIVE: ICD-10-CM

## 2019-02-12 DIAGNOSIS — Z12.39 BREAST CANCER SCREENING: ICD-10-CM

## 2019-02-12 DIAGNOSIS — Z87.898 HISTORY OF CHEST PAIN: ICD-10-CM

## 2019-02-12 DIAGNOSIS — K21.9 GASTROESOPHAGEAL REFLUX DISEASE WITHOUT ESOPHAGITIS: ICD-10-CM

## 2019-02-12 PROBLEM — E78.00 HIGH CHOLESTEROL: Status: ACTIVE | Noted: 2019-02-12

## 2019-02-12 PROBLEM — I10 ESSENTIAL HYPERTENSION: Status: ACTIVE | Noted: 2019-02-12

## 2019-02-12 PROBLEM — F32.A ANXIETY AND DEPRESSION: Status: ACTIVE | Noted: 2019-02-12

## 2019-02-12 PROBLEM — G47.09 OTHER INSOMNIA: Status: ACTIVE | Noted: 2019-02-12

## 2019-02-12 PROBLEM — E03.9 ACQUIRED HYPOTHYROIDISM: Status: ACTIVE | Noted: 2019-02-12

## 2019-02-12 PROBLEM — F41.9 ANXIETY AND DEPRESSION: Status: ACTIVE | Noted: 2019-02-12

## 2019-02-12 PROCEDURE — 3008F BODY MASS INDEX DOCD: CPT | Mod: CPTII,S$GLB,, | Performed by: NURSE PRACTITIONER

## 2019-02-12 PROCEDURE — 99999 PR PBB SHADOW E&M-EST. PATIENT-LVL IV: CPT | Mod: PBBFAC,,, | Performed by: NURSE PRACTITIONER

## 2019-02-12 PROCEDURE — 3074F SYST BP LT 130 MM HG: CPT | Mod: CPTII,S$GLB,, | Performed by: NURSE PRACTITIONER

## 2019-02-12 PROCEDURE — 87491 CHLMYD TRACH DNA AMP PROBE: CPT

## 2019-02-12 PROCEDURE — 3008F PR BODY MASS INDEX (BMI) DOCUMENTED: ICD-10-PCS | Mod: CPTII,S$GLB,, | Performed by: NURSE PRACTITIONER

## 2019-02-12 PROCEDURE — 3078F PR MOST RECENT DIASTOLIC BLOOD PRESSURE < 80 MM HG: ICD-10-PCS | Mod: CPTII,S$GLB,, | Performed by: NURSE PRACTITIONER

## 2019-02-12 PROCEDURE — 99213 PR OFFICE/OUTPT VISIT, EST, LEVL III, 20-29 MIN: ICD-10-PCS | Mod: S$GLB,,, | Performed by: NURSE PRACTITIONER

## 2019-02-12 PROCEDURE — 99999 PR PBB SHADOW E&M-EST. PATIENT-LVL IV: ICD-10-PCS | Mod: PBBFAC,,, | Performed by: NURSE PRACTITIONER

## 2019-02-12 PROCEDURE — 3074F PR MOST RECENT SYSTOLIC BLOOD PRESSURE < 130 MM HG: ICD-10-PCS | Mod: CPTII,S$GLB,, | Performed by: NURSE PRACTITIONER

## 2019-02-12 PROCEDURE — 3078F DIAST BP <80 MM HG: CPT | Mod: CPTII,S$GLB,, | Performed by: NURSE PRACTITIONER

## 2019-02-12 PROCEDURE — 99213 OFFICE O/P EST LOW 20 MIN: CPT | Mod: S$GLB,,, | Performed by: NURSE PRACTITIONER

## 2019-02-12 RX ORDER — ONDANSETRON 4 MG/1
4 TABLET, FILM COATED ORAL EVERY 6 HOURS PRN
Qty: 20 TABLET | Refills: 0 | Status: SHIPPED | OUTPATIENT
Start: 2019-02-12 | End: 2019-02-17

## 2019-02-12 NOTE — PATIENT INSTRUCTIONS
Zantac or Maalox   Tips to Control Acid Reflux    To control acid reflux, youll need to make some basic diet and lifestyle changes. The simple steps outlined below may be all youll need to ease discomfort.  Watch what you eat  · Avoid fatty foods and spicy foods.  · Eat fewer acidic foods, such as citrus and tomato-based foods. These can increase symptoms.  · Limit drinking alcohol, caffeine, and fizzy beverages. All increase acid reflux.  · Try limiting chocolate, peppermint, and spearmint. These can worsen acid reflux in some people.  Watch when you eat  · Avoid lying down for 3 hours after eating.  · Do not snack before going to bed.  Raise your head  Raising your head and upper body by 4 to 6 inches helps limit reflux when youre lying down. Put blocks under the head of your bed frame to raise it.  Other changes  · Lose weight, if you need to  · Dont exercise near bedtime  · Avoid tight-fitting clothes  · Limit aspirin and ibuprofen  · Stop smoking   Date Last Reviewed: 7/1/2016  © 6899-1735 [employer]. [address] [place] [address], [place] [address]. All rights reserved. This information is not intended as a substitute for professional medical care. Always follow your healthcare professional's instructions.

## 2019-02-12 NOTE — LETTER
February 12, 2019      [facility]  [address] [place] [address]  Phone: [phone]  Fax: [phone]       Patient: Razia Avila   YOB: 1975  Date of Visit: 02/12/2019    To Whom It May Concern:    Magen Avila  was at [facility] on 02/12/2019. If you have any questions or concerns, or if I can be of further assistance, please do not hesitate to contact me.    Sincerely,      Tricia Mobley NP

## 2019-02-13 ENCOUNTER — TELEPHONE (OUTPATIENT)
Dept: FAMILY MEDICINE | Facility: CLINIC | Age: 44
End: 2019-02-13

## 2019-02-13 LAB
C TRACH DNA SPEC QL NAA+PROBE: NOT DETECTED
N GONORRHOEA DNA SPEC QL NAA+PROBE: NOT DETECTED

## 2019-02-13 RX ORDER — VALACYCLOVIR HYDROCHLORIDE 500 MG/1
TABLET, FILM COATED ORAL
Qty: 60 TABLET | Refills: 11 | Status: SHIPPED | OUTPATIENT
Start: 2019-02-13 | End: 2019-06-25 | Stop reason: SDUPTHER

## 2019-02-13 NOTE — PROGRESS NOTES
Subjective:       Patient ID: Razia Avila is a 43 y.o. female.    Chief Complaint: Abdominal Pain    Ms. Avila is a 43 year old female patient who presents today with complaints of abdominal pain. She was admitted to the hospital 1/1 for chest pain, diaphoresis, nausea and arm pain. Cardiac workup was negative. She was discharged and instructed to F/U with PCP and cardiologist. She originally followed up at the [facility]. On the second visit she was referred to general surgery for RLQ pain. Has appointment for 2/14 for cholelithiasis and diverticulosis that was found on abdominal CT while hospitalized. Today she is complaining of epigastric burning and nausea. These are not new symptoms. She is requesting something for nausea until she is seen by GI. Also inquiring about STI testing that was previously completed. Was treated for BV. Complaining today of thin, white vaginal discharge. Stated she was tested for chlamydia and gonorrhea, but was told the urine was left out too long. She would like to repeat this. She is due for a mammogram. Medications and medical/surgical reviewed.       Patient Active Problem List   Diagnosis    Chest pain    Essential hypertension    Acquired hypothyroidism    High cholesterol    Other insomnia    Anxiety and depression     Review of Systems   Constitutional: Negative for fatigue.   Respiratory: Negative for cough, shortness of breath and wheezing.    Cardiovascular: Negative for chest pain and palpitations.   Gastrointestinal: Negative for abdominal distention.   Genitourinary: Positive for vaginal discharge. Negative for flank pain and urgency.   Neurological: Negative for dizziness and light-headedness.       Objective:      Physical Exam   Constitutional: She is oriented to person, place, and time. She appears well-developed and well-nourished.   Neck: Neck supple.   Cardiovascular: Normal rate, regular rhythm and normal heart sounds.   No murmur  heard.  Pulmonary/Chest: Effort normal and breath sounds normal. She has no wheezes.   Abdominal: Soft. There is tenderness (RLQ).   Lymphadenopathy:     She has no cervical adenopathy.   Neurological: She is alert and oriented to person, place, and time.   Skin: Skin is warm and dry.   Psychiatric: She has a normal mood and affect.   Nursing note and vitals reviewed.      Assessment:       1. Nausea    2. Gastroesophageal reflux disease without esophagitis    3. History of chest pain    4. Exposure to STD    5. HSV-2 seropositive    6. Breast cancer screening    7. BMI 29.0-29.9,adult        Plan:        Razia was seen today for abdominal pain.    Diagnoses and all orders for this visit:    Nausea  -     ondansetron (ZOFRAN) 4 MG tablet; Take 1 tablet (4 mg total) by mouth every 6 (six) hours as needed for Nausea.  Continue with general surgery referral    Gastroesophageal reflux disease without esophagitis  Counseled patient on prevention of reflux with changes in diet and behavior.  I recommended avoidance of greasy and spicy foods, caffeine and eating within 3 hours of bedtime.  I counseled the patient to avoid eating large meals and instead eating more frequent small meals.  I also recommended weight loss and elevation of the head of the bed by 6 inches.  If symptoms persist after these changes medication may be needed to control GERD.  Recommend PRN zantac and maalox     History of chest pain  F/U with cardiology as instructed     Exposure to STD  -     C. trachomatis/N. gonorrhoeae by [facility]; Urine  Repeat screening; treat as needed    HSV-2 seropositive   No history of outbreak- patient states she has never been diagnosed   Educated patient to notify clinic if symptomatic     Breast cancer screening  -     Mammo Digital Screening Bilateral With CAD; Future    BMI 29.0-29.9,adult  Counseled patient on his ideal body weight, health consequences of being obese and current recommendations including  "weekly exercise and a heart healthy diet.  Current BMI is:Estimated body mass index is 29.41 kg/m² as calculated from the following:    Height as of this encounter: 5' 3" (1.6 m).    Weight as of this encounter: 75.3 kg (166 lb 0.1 oz)..  Patient is aware that ideal BMI < 25 or Weight in (lb) to have BMI = 25: 140.8.    F/U 1 month      "

## 2019-02-14 ENCOUNTER — OFFICE VISIT (OUTPATIENT)
Dept: SURGERY | Facility: CLINIC | Age: 44
End: 2019-02-14
Payer: COMMERCIAL

## 2019-02-14 VITALS
OXYGEN SATURATION: 97 % | WEIGHT: 170.63 LBS | BODY MASS INDEX: 30.23 KG/M2 | HEIGHT: 63 IN | DIASTOLIC BLOOD PRESSURE: 66 MMHG | HEART RATE: 72 BPM | RESPIRATION RATE: 16 BRPM | SYSTOLIC BLOOD PRESSURE: 106 MMHG | TEMPERATURE: 99 F

## 2019-02-14 DIAGNOSIS — I10 ESSENTIAL HYPERTENSION: ICD-10-CM

## 2019-02-14 DIAGNOSIS — E03.9 ACQUIRED HYPOTHYROIDISM: ICD-10-CM

## 2019-02-14 DIAGNOSIS — E78.5 HYPERLIPIDEMIA, UNSPECIFIED HYPERLIPIDEMIA TYPE: ICD-10-CM

## 2019-02-14 DIAGNOSIS — K80.20 CALCULUS OF GALLBLADDER WITHOUT CHOLECYSTITIS WITHOUT OBSTRUCTION: Primary | ICD-10-CM

## 2019-02-14 PROCEDURE — 99205 PR OFFICE/OUTPT VISIT, NEW, LEVL V, 60-74 MIN: ICD-10-PCS | Mod: S$GLB,,, | Performed by: SURGERY

## 2019-02-14 PROCEDURE — 3078F PR MOST RECENT DIASTOLIC BLOOD PRESSURE < 80 MM HG: ICD-10-PCS | Mod: CPTII,S$GLB,, | Performed by: SURGERY

## 2019-02-14 PROCEDURE — 99205 OFFICE O/P NEW HI 60 MIN: CPT | Mod: S$GLB,,, | Performed by: SURGERY

## 2019-02-14 PROCEDURE — 3074F SYST BP LT 130 MM HG: CPT | Mod: CPTII,S$GLB,, | Performed by: SURGERY

## 2019-02-14 PROCEDURE — 3074F PR MOST RECENT SYSTOLIC BLOOD PRESSURE < 130 MM HG: ICD-10-PCS | Mod: CPTII,S$GLB,, | Performed by: SURGERY

## 2019-02-14 PROCEDURE — 3008F PR BODY MASS INDEX (BMI) DOCUMENTED: ICD-10-PCS | Mod: CPTII,S$GLB,, | Performed by: SURGERY

## 2019-02-14 PROCEDURE — 3008F BODY MASS INDEX DOCD: CPT | Mod: CPTII,S$GLB,, | Performed by: SURGERY

## 2019-02-14 PROCEDURE — 3078F DIAST BP <80 MM HG: CPT | Mod: CPTII,S$GLB,, | Performed by: SURGERY

## 2019-02-14 RX ORDER — LIDOCAINE HYDROCHLORIDE 10 MG/ML
1 INJECTION, SOLUTION EPIDURAL; INFILTRATION; INTRACAUDAL; PERINEURAL ONCE
Status: CANCELLED | OUTPATIENT
Start: 2019-02-14 | End: 2019-02-14

## 2019-02-14 RX ORDER — SODIUM CHLORIDE, SODIUM LACTATE, POTASSIUM CHLORIDE, CALCIUM CHLORIDE 600; 310; 30; 20 MG/100ML; MG/100ML; MG/100ML; MG/100ML
INJECTION, SOLUTION INTRAVENOUS CONTINUOUS
Status: CANCELLED | OUTPATIENT
Start: 2019-02-14

## 2019-02-14 NOTE — PROGRESS NOTES
Subjective:       Patient ID: Razia Avila is a 43 y.o. female.    Chief Complaint: Consult and Gall Bladder Problem      HPI:  Ms. Avila presents today as a consult from [facility].  She has been experiencing postprandial right upper quadrant abdominal pain with nausea for the past 2+ months.  No fever or chills.  No jaundice, biliuria, acholia.  She has experienced vomiting and diarrhea.  No melena, hematochezia, hematemesis.  Symptoms are aggravated by eating especially meals high in fat content.  No other aggravating factors.  No alleviating factors.  No other associated symptoms.  Evaluation thus far included a CT scan of the abdomen pelvis that showed the presence of cholelithiasis.    CT scan of the abdomen pelvis films and report reviewed from 1/3/2019.  Cholelithiasis was noted. No evidence of choledocholithiasis or acute cholecystitis.    Lab results reviewed from 1/3/2019.  CBC showed no evidence of leukocytosis or thrombocytopenia.  A mild normochromic normocytic anemia with a hemoglobin of 11.5 grams/deciliter was noted. Electrolytes were all in the range of normal except a mild hypocalcemia.  BUN and creatinine shows no evidence of renal dysfunction.  Glucose shows no suspicion diabetes.  Liver profile showed no evidence of hepatocellular disease or biliary obstruction.  Amylase and lipase showed no suggestion pancreatitis.  TSH on 1/2/2019 indicates adequate thyroid hormone replacement therapy.        Allergies & Meds:  Review of patient's allergies indicates:  No Known Allergies    Current Outpatient Medications   Medication Sig Dispense Refill    aspirin (ECOTRIN) 81 MG EC tablet Take 81 mg by mouth once daily.      atorvastatin (LIPITOR) 20 MG tablet Take 1 tablet (20 mg total) by mouth once daily. 90 tablet 1    FLUoxetine 10 MG capsule Take 1 capsule (10 mg total) by mouth once daily. 90 capsule 1    levonorgestrel-ethinyl estradiol (SEASONALE) 0.15 mg-30 mcg per  tablet Take 1 tablet by mouth once daily. 90 tablet 1    levothyroxine (SYNTHROID) 125 MCG tablet Take 1 tablet (125 mcg total) by mouth once daily. 90 tablet 1    lisinopril-hydrochlorothiazide (PRINZIDE,ZESTORETIC) 10-12.5 mg per tablet Take 1 tablet by mouth once daily. 90 tablet 1    ondansetron (ZOFRAN) 4 MG tablet Take 1 tablet (4 mg total) by mouth every 6 (six) hours as needed for Nausea. 20 tablet 0    potassium chloride (K-TAB) 20 mEq Take 20 mEq by mouth once.      pseudoephedrine (SUDAFED) 30 MG tablet Take 1 tablet (30 mg total) by mouth every 4 (four) hours as needed for Congestion. 60 tablet 0    temazepam (RESTORIL) 15 mg Cap Take 1 capsule (15 mg total) by mouth nightly as needed. 30 capsule 5    valACYclovir (VALTREX) 500 MG tablet 500mg q12 hours x  3 days for recurrence. 60 tablet 11     No current facility-administered medications for this visit.        PMFSHx:  Past Medical History:   Diagnosis Date    Hyperlipidemia     Hypertension     Pancreatitis     Thyroid disease        History reviewed. No pertinent surgical history.    Family History   Problem Relation Age of Onset    Glaucoma Mother     Heart disease Father     Hyperlipidemia Father     Hypertension Father     Diabetes Brother        Social History     Tobacco Use    Smoking status: Never Smoker    Smokeless tobacco: Never Used   Substance Use Topics    Alcohol use: Yes     Comment: OCCASIONAL SOCIAL DRINK    Drug use: No       Review of Systems   Constitutional: Negative for appetite change, chills, fatigue, fever and unexpected weight change.   HENT: Negative for congestion, dental problem, ear pain, mouth sores, postnasal drip, rhinorrhea, sore throat, tinnitus, trouble swallowing and voice change.    Eyes: Negative for photophobia, pain, discharge and visual disturbance.   Respiratory: Negative for cough, chest tightness, shortness of breath and wheezing.    Cardiovascular: Negative for chest pain, palpitations  and leg swelling.   Gastrointestinal: Negative for blood in stool and constipation.   Endocrine: Negative for cold intolerance, heat intolerance, polydipsia, polyphagia and polyuria.   Genitourinary: Negative for difficulty urinating, dysuria, flank pain, frequency, hematuria and urgency.   Musculoskeletal: Negative for arthralgias, joint swelling and myalgias.   Skin: Negative for color change and rash.   Allergic/Immunologic: Negative for immunocompromised state.   Neurological: Negative for dizziness, tremors, seizures, syncope, speech difficulty, weakness, numbness and headaches.   Hematological: Negative for adenopathy. Does not bruise/bleed easily.   Psychiatric/Behavioral: Negative for agitation, confusion, hallucinations, self-injury and suicidal ideas. The patient is not nervous/anxious.        Objective:      Physical Exam   Constitutional: She is oriented to person, place, and time. She appears well-developed and well-nourished. She is active.  Non-toxic appearance. No distress.   Body mass index is 30.22 kg/m².     HENT:   Head: Normocephalic and atraumatic.   Right Ear: Hearing and external ear normal. No drainage or tenderness.   Left Ear: Hearing and external ear normal. No drainage or tenderness.   Nose: Nose normal. No rhinorrhea. No epistaxis.   Mouth/Throat: Uvula is midline, oropharynx is clear and moist and mucous membranes are normal. Mucous membranes are not pale, not dry and not cyanotic. No oropharyngeal exudate.   Eyes: Conjunctivae and lids are normal. Pupils are equal, round, and reactive to light. Right eye exhibits no discharge and no exudate. Left eye exhibits no discharge and no exudate. Right conjunctiva is not injected. Right eye exhibits no nystagmus. Left eye exhibits no nystagmus.   Neck: Trachea normal, full passive range of motion without pain and phonation normal. No JVD present. Carotid bruit is not present. No tracheal deviation present. No thyroid mass and no thyromegaly  present.   Cardiovascular: Normal rate, regular rhythm, S1 normal, S2 normal, normal heart sounds and intact distal pulses. PMI is not displaced. Exam reveals no gallop and no friction rub.   No murmur heard.  Pulmonary/Chest: Effort normal and breath sounds normal. No accessory muscle usage. No respiratory distress. She exhibits no mass, no tenderness and no crepitus. Right breast exhibits no inverted nipple, no mass, no nipple discharge, no skin change and no tenderness. Left breast exhibits no inverted nipple, no mass, no nipple discharge, no skin change and no tenderness. Breasts are symmetrical.   Abdominal: Soft. Normal appearance and bowel sounds are normal. She exhibits no distension, no fluid wave, no abdominal bruit and no mass. There is no hepatosplenomegaly. There is no tenderness. There is no rebound, no guarding and negative Ochoa's sign. No hernia. Hernia confirmed negative in the right inguinal area and confirmed negative in the left inguinal area.   Genitourinary: Rectum normal and vagina normal. There is no rash or lesion on the right labia. There is no rash or lesion on the left labia. Right adnexum displays no mass. Left adnexum displays no mass. No vaginal discharge found.   Musculoskeletal:        Cervical back: Normal.        Thoracic back: Normal.        Lumbar back: Normal.        Right upper arm: Normal.        Left upper arm: Normal.        Right forearm: Normal.        Left forearm: Normal.        Right hand: Normal.        Left hand: Normal.        Right upper leg: Normal.        Left upper leg: Normal.        Right lower leg: Normal.        Left lower leg: Normal.        Right foot: Normal.        Left foot: Normal.   Lymphadenopathy:        Head (right side): No submental, no submandibular and no posterior auricular adenopathy present.        Head (left side): No submental, no submandibular and no posterior auricular adenopathy present.     She has no cervical adenopathy.     She has  no axillary adenopathy.        Right: No inguinal and no supraclavicular adenopathy present.        Left: No inguinal and no supraclavicular adenopathy present.   Neurological: She is alert and oriented to person, place, and time. She has normal strength. No cranial nerve deficit or sensory deficit. Coordination and gait normal. GCS eye subscore is 4. GCS verbal subscore is 5. GCS motor subscore is 6.   Skin: Skin is warm, dry and intact. No rash noted. No cyanosis. Nails show no clubbing.   Psychiatric: She has a normal mood and affect. Her speech is normal. Judgment and thought content normal. Her mood appears not anxious. Her affect is not inappropriate. She is not actively hallucinating. She does not exhibit a depressed mood.         Test Results:   See above    Assessment:       Postprandial right upper quadrant abdominal pain.  Abnormal CT scan of the gallbladder with evidence of cholelithiasis.  Differential diagnosis includes but is not limited to cholecystitis, gallbladder dyskinesia, ampullary dysfunction, cholelithiasis, ulcer disease, gastritis, IBS, IBD, etc.  Options include but not limited to laparoscopic cholecystectomy, laparotomy and cholecystectomy, endoscopy, radiologic studies, second opinion, observation, medical therapy, nonoperative therapy, symptomatic treatment, etc.  Multiple comorbid issues ( hypertension, hyperlipidemia, hypothyroidism ) increase the complexity of required perioperative evaluation & management, risks of complications, and likely will increase the difficulty and complexity of postoperative care, etc.  These issues must be factored in to preoperative evaluation and perioperative management.    1. Calculus of gallbladder without cholecystitis without obstruction    2. Essential hypertension    3. Hyperlipidemia, unspecified hyperlipidemia type    4. Acquired hypothyroidism        Plan:   Calculus of gallbladder without cholecystitis without obstruction  -     Case Request  Operating Room: CHOLECYSTECTOMY-LAPAROSCOPIC  -     Comprehensive metabolic panel; Future; Expected date: 02/14/2019  -     Amylase; Future; Expected date: 02/14/2019  -     Lipase; Future; Expected date: 02/14/2019  -     CBC auto differential; Future; Expected date: 02/14/2019  -     Pregnancy, urine rapid; Future; Expected date: 02/14/2019  -     EKG 12-lead; Future; Expected date: 02/14/2019    Essential hypertension    Hyperlipidemia, unspecified hyperlipidemia type    Acquired hypothyroidism    Other orders  -     Full code; Standing  -     Place in Outpatient; Standing  -     Vital signs; Standing  -     Insert peripheral IV; Standing  -     lidocaine (PF) 10 mg/ml (1%) injection 10 mg  -     Verify beta-blocker dose taken within 24 hours if patient is prescribed beta-blocker; Standing  -     Height and weight; Standing  -     Verify discontinuation of antithrombotics; Standing  -     Verify blood consent; Standing  -     Verify consent; Standing  -     Void on call to Operating Room; Standing  -     Diet NPO; Standing  -     lactated ringers infusion  -     IP VTE LOW RISK PATIENT; Standing  -     Place YASMIN hose; Standing  -     Place sequential compression device; Standing  -     ertapenem (INVANZ) 1 g in sodium chloride 0.9% 100 mL IVPB        Follow-up in about 1 month (around 3/14/2019) for routine postop evaluation.    Counseling/Medical Decision Making:  Razia Avila was counseled regarding the results of the evaluation thus far and the differential diagnosis including but not limited to: cholelithiasis, cholecystitis, choledocholithiasis, gastritis, duodenitis, ulcer disease, reflux disease, Sphincter of Oddi Dysfunction, pancreatitis, irritable bowel syndrome, inflammatory bowel disease, diverticular disease, neoplastic disease, infectious disease, ischemic disease, etc.  We also discussed all diagnostic and therapeutic options as well as the risks, benefits, possible complications, details of, and  indications for each option.  Options discussed included but were not limited to: endoscopy, laparoscopic cholecystectomy, conventional cholecystectomy, radiologic studies, second opinion, observation, empiric therapy, symptomatic therapy, stone dissolving therapy, lithotripsy, referral elsewhere for treatment, etc.  Possible complications of laparoscopic surgery discussed included but were not limited to: bleeding, infections, scars, chronic pain, nerve damage, internal injuries, bile duct injuries, inability to complete the operation laparoscopically / need to convert to an open technique, retained stones, persistent symptoms, need for additional operations or procedures, chronic diarrhea, weight gain, weight loss, etc.  Questions were solicited and answered.  [employer] publications were provided regarding endoscopy, gallbladder disease, laparoscopic surgery, etc.  I read the entire consent form to her verbatim. A copy of the consent form was provided for her review outside the office and hospital prior to the procedure.  Entire conversation was held in laymans terms.  All unfamiliar terms defined.  At the conclusion of the conversation she voiced complete understanding of all we discussed, satisfaction that all questions were answered, and that she felt fully informed and completely capable of making an informed decision.  She requests and desires to proceed with an attempted laparoscopic cholecystectomy.    Total face-to-face encounter time was 60 minutes, 40 minutes spent counseling as outlined/summarized above.

## 2019-02-14 NOTE — H&P (VIEW-ONLY)
[facility] - General Surgery  General Surgery  H&P      Patient Name: Razia Avila  MRN: [id number]     Chief Complaint:  I am having my gallbladder removed      HPI:  Ms. Avila presents today as a consult from [facility].  She has been experiencing postprandial right upper quadrant abdominal pain with nausea for the past 2+ months.  No fever or chills.  No jaundice, biliuria, acholia.  She has experienced vomiting and diarrhea.  No melena, hematochezia, hematemesis.  Symptoms are aggravated by eating especially meals high in fat content.  No other aggravating factors.  No alleviating factors.  No other associated symptoms.  Evaluation thus far included a CT scan of the abdomen pelvis that showed the presence of cholelithiasis.    CT scan of the abdomen pelvis films and report reviewed from 1/3/2019.  Cholelithiasis was noted. No evidence of choledocholithiasis or acute cholecystitis.    Lab results reviewed from 1/3/2019.  CBC showed no evidence of leukocytosis or thrombocytopenia.  A mild normochromic normocytic anemia with a hemoglobin of 11.5 grams/deciliter was noted. Electrolytes were all in the range of normal except a mild hypocalcemia.  BUN and creatinine shows no evidence of renal dysfunction.  Glucose shows no suspicion diabetes.  Liver profile showed no evidence of hepatocellular disease or biliary obstruction.  Amylase and lipase showed no suggestion pancreatitis.  TSH on 1/2/2019 indicates adequate thyroid hormone replacement therapy.        Allergies & Meds:    No Known Allergies    Current Outpatient Medications   Medication Sig Dispense Refill    aspirin (ECOTRIN) 81 MG EC tablet Take 81 mg by mouth once daily.      atorvastatin (LIPITOR) 20 MG tablet Take 1 tablet (20 mg total) by mouth once daily. 90 tablet 1    FLUoxetine 10 MG capsule Take 1 capsule (10 mg total) by mouth once daily. 90 capsule 1    levonorgestrel-ethinyl estradiol (SEASONALE)  0.15 mg-30 mcg per tablet Take 1 tablet by mouth once daily. 90 tablet 1    levothyroxine (SYNTHROID) 125 MCG tablet Take 1 tablet (125 mcg total) by mouth once daily. 90 tablet 1    lisinopril-hydrochlorothiazide (PRINZIDE,ZESTORETIC) 10-12.5 mg per tablet Take 1 tablet by mouth once daily. 90 tablet 1    ondansetron (ZOFRAN) 4 MG tablet Take 1 tablet (4 mg total) by mouth every 6 (six) hours as needed for Nausea. 20 tablet 0    potassium chloride (K-TAB) 20 mEq Take 20 mEq by mouth once.      pseudoephedrine (SUDAFED) 30 MG tablet Take 1 tablet (30 mg total) by mouth every 4 (four) hours as needed for Congestion. 60 tablet 0    temazepam (RESTORIL) 15 mg Cap Take 1 capsule (15 mg total) by mouth nightly as needed. 30 capsule 5    valACYclovir (VALTREX) 500 MG tablet 500mg q12 hours x  3 days for recurrence. 60 tablet 11         PMFSHx:  Past Medical History:   Diagnosis Date    Hyperlipidemia     Hypertension     Pancreatitis     Thyroid disease        History reviewed. No pertinent surgical history.    Family History   Problem Relation Age of Onset    Glaucoma Mother     Heart disease Father     Hyperlipidemia Father     Hypertension Father     Diabetes Brother        Social History     Tobacco Use    Smoking status: Never Smoker    Smokeless tobacco: Never Used   Substance Use Topics    Alcohol use: Yes     Comment: OCCASIONAL SOCIAL DRINK    Drug use: No       Review of Systems   Constitutional: Negative for appetite change, chills, fatigue, fever and unexpected weight change.   HENT: Negative for congestion, dental problem, ear pain, mouth sores, postnasal drip, rhinorrhea, sore throat, tinnitus, trouble swallowing and voice change.    Eyes: Negative for photophobia, pain, discharge and visual disturbance.   Respiratory: Negative for cough, chest tightness, shortness of breath and wheezing.   Cardiovascular: Negative for chest pain, palpitations and leg swelling.   Gastrointestinal:  Negative for blood in stool and constipation.   Endocrine: Negative for cold intolerance, heat intolerance, polydipsia, polyphagia and polyuria.   Genitourinary: Negative for difficulty urinating, dysuria, flank pain, frequency, hematuria and urgency.   Musculoskeletal: Negative for arthralgias, joint swelling and myalgias.   Skin: Negative for color change and rash.   Allergic/Immunologic: Negative for immunocompromised state.   Neurological: Negative for dizziness, tremors, seizures, syncope, speech difficulty, weakness, numbness and headaches.   Hematological: Negative for adenopathy. Does not bruise/bleed easily.   Psychiatric/Behavioral: Negative for agitation, confusion, hallucinations, self-injury and suicidal ideas. The patient is not nervous/anxious.           Physical Exam   Constitutional: She is oriented to person, place, and time. She appears well-developed and well-nourished. She is active.  Non-toxic appearance. No distress. Body mass index is 30.22 kg/m².   HENT:   Head: Normocephalic and atraumatic.   Right Ear: Hearing and external ear normal. No drainage or tenderness.   Left Ear: Hearing and external ear normal. No drainage or tenderness.   Nose: Nose normal. No rhinorrhea. No epistaxis.   Mouth/Throat: Uvula is midline, oropharynx is clear and moist and mucous membranes are normal. Mucous membranes are not pale, not dry and not cyanotic. No oropharyngeal exudate.   Eyes: Conjunctivae and lids are normal. Pupils are equal, round, and reactive to light. Right eye exhibits no discharge and no exudate. Left eye exhibits no discharge and no exudate. Right conjunctiva is not injected. Right eye exhibits no nystagmus. Left eye exhibits no nystagmus.   Neck: Trachea normal, full passive range of motion without pain and phonation normal. No JVD present. Carotid bruit is not present. No tracheal deviation present. No thyroid mass and no thyromegaly present.   Cardiovascular: Normal rate, regular rhythm,  S1 normal, S2 normal, normal heart sounds and intact distal pulses. PMI is not displaced. Exam reveals no gallop and no friction rub.   No murmur heard.  Pulmonary/Chest: Effort normal and breath sounds normal. No accessory muscle usage. No respiratory distress. She exhibits no mass, no tenderness and no crepitus. Right breast exhibits no inverted nipple, no mass, no nipple discharge, no skin change and no tenderness. Left breast exhibits no inverted nipple, no mass, no nipple discharge, no skin change and no tenderness. Breasts are symmetrical.   Abdominal: Soft. Normal appearance and bowel sounds are normal. She exhibits no distension, no fluid wave, no abdominal bruit and no mass. There is no hepatosplenomegaly. There is no tenderness. There is no rebound, no guarding and negative Ochoa's sign. No hernia. Hernia confirmed negative in the right inguinal area and confirmed negative in the left inguinal area.   Genitourinary: Rectum normal and vagina normal. There is no rash or lesion on the right labia. There is no rash or lesion on the left labia. Right adnexum displays no mass. Left adnexum displays no mass. No vaginal discharge found.   Musculoskeletal:        Cervical back: Normal.        Thoracic back: Normal.        Lumbar back: Normal.        Right upper arm: Normal.        Left upper arm: Normal.        Right forearm: Normal.        Left forearm: Normal.        Right hand: Normal.        Left hand: Normal.        Right upper leg: Normal.        Left upper leg: Normal.        Right lower leg: Normal.        Left lower leg: Normal.        Right foot: Normal.        Left foot: Normal.   Lymphadenopathy:        Head (right side): No submental, no submandibular and no posterior auricular adenopathy present.        Head (left side): No submental, no submandibular and no posterior auricular adenopathy present.     She has no cervical adenopathy.     She has no axillary adenopathy.        Right: No inguinal and no  supraclavicular adenopathy present.        Left: No inguinal and no supraclavicular adenopathy present.   Neurological: She is alert and oriented to person, place, and time. She has normal strength. No cranial nerve deficit or sensory deficit. Coordination and gait normal. GCS eye subscore is 4. GCS verbal subscore is 5. GCS motor subscore is 6.   Skin: Skin is warm, dry and intact. No rash noted. No cyanosis. Nails show no clubbing.   Psychiatric: She has a normal mood and affect. Her speech is normal. Judgment and thought content normal. Her mood appears not anxious. Her affect is not inappropriate. She is not actively hallucinating. She does not exhibit a depressed mood.         Test Results:   See above    Assessment:       Postprandial right upper quadrant abdominal pain.  Abnormal CT scan of the gallbladder with evidence of cholelithiasis.  Differential diagnosis includes but is not limited to cholecystitis, gallbladder dyskinesia, ampullary dysfunction, cholelithiasis, ulcer disease, gastritis, IBS, IBD, etc.  Options include but not limited to laparoscopic cholecystectomy, laparotomy and cholecystectomy, endoscopy, radiologic studies, second opinion, observation, medical therapy, nonoperative therapy, symptomatic treatment, etc.  Multiple comorbid issues ( hypertension, hyperlipidemia, hypothyroidism ) increase the complexity of required perioperative evaluation & management, risks of complications, and likely will increase the difficulty and complexity of postoperative care, etc.  These issues must be factored in to preoperative evaluation and perioperative management.    1. Calculus of gallbladder without cholecystitis without obstruction    2. Essential hypertension    3. Hyperlipidemia, unspecified hyperlipidemia type    4. Acquired hypothyroidism        Plan:   Calculus of gallbladder without cholecystitis without obstruction  -     Case Request Operating Room: CHOLECYSTECTOMY-LAPAROSCOPIC  -      Comprehensive metabolic panel; Future; Expected date: 02/14/2019  -     Amylase; Future; Expected date: 02/14/2019  -     Lipase; Future; Expected date: 02/14/2019  -     CBC auto differential; Future; Expected date: 02/14/2019  -     Pregnancy, urine rapid; Future; Expected date: 02/14/2019  -     EKG 12-lead; Future; Expected date: 02/14/2019    Essential hypertension    Hyperlipidemia, unspecified hyperlipidemia type    Acquired hypothyroidism    Other orders  -     Full code; Standing  -     Place in Outpatient; Standing  -     Vital signs; Standing  -     Insert peripheral IV; Standing  -     lidocaine (PF) 10 mg/ml (1%) injection 10 mg  -     Verify beta-blocker dose taken within 24 hours if patient is prescribed beta-blocker; Standing  -     Height and weight; Standing  -     Verify discontinuation of antithrombotics; Standing  -     Verify blood consent; Standing  -     Verify consent; Standing  -     Void on call to Operating Room; Standing  -     Diet NPO; Standing  -     lactated ringers infusion  -     IP VTE LOW RISK PATIENT; Standing  -     Place YASMIN hose; Standing  -     Place sequential compression device; Standing  -     ertapenem (INVANZ) 1 g in sodium chloride 0.9% 100 mL IVPB        Follow-up around 3/14/2019 for routine postop evaluation.    Counseling/Medical Decision Making:  Razia Avila was counseled regarding the results of the evaluation thus far and the differential diagnosis including but not limited to: cholelithiasis, cholecystitis, choledocholithiasis, gastritis, duodenitis, ulcer disease, reflux disease, Sphincter of Oddi Dysfunction, pancreatitis, irritable bowel syndrome, inflammatory bowel disease, diverticular disease, neoplastic disease, infectious disease, ischemic disease, etc.  We also discussed all diagnostic and therapeutic options as well as the risks, benefits, possible complications, details of, and indications for each option.  Options discussed included but were not  limited to: endoscopy, laparoscopic cholecystectomy, conventional cholecystectomy, radiologic studies, second opinion, observation, empiric therapy, symptomatic therapy, stone dissolving therapy, lithotripsy, referral elsewhere for treatment, etc.  Possible complications of laparoscopic surgery discussed included but were not limited to: bleeding, infections, scars, chronic pain, nerve damage, internal injuries, bile duct injuries, inability to complete the operation laparoscopically / need to convert to an open technique, retained stones, persistent symptoms, need for additional operations or procedures, chronic diarrhea, weight gain, weight loss, etc.  Questions were solicited and answered.  [employer] publications were provided regarding endoscopy, gallbladder disease, laparoscopic surgery, etc.  I read the entire consent form to her verbatim. A copy of the consent form was provided for her review outside the office and hospital prior to the procedure.  Entire conversation was held in laymans terms.  All unfamiliar terms defined.  At the conclusion of the conversation she voiced complete understanding of all we discussed, satisfaction that all questions were answered, and that she felt fully informed and completely capable of making an informed decision.  She requests and desires to proceed with an attempted laparoscopic cholecystectomy.

## 2019-02-14 NOTE — H&P
[facility] - General Surgery  General Surgery  H&P      Patient Name: Razia Avila  MRN: [id number]     Chief Complaint:  I am having my gallbladder removed      HPI:  Ms. Avila presents today as a consult from [facility].  She has been experiencing postprandial right upper quadrant abdominal pain with nausea for the past 2+ months.  No fever or chills.  No jaundice, biliuria, acholia.  She has experienced vomiting and diarrhea.  No melena, hematochezia, hematemesis.  Symptoms are aggravated by eating especially meals high in fat content.  No other aggravating factors.  No alleviating factors.  No other associated symptoms.  Evaluation thus far included a CT scan of the abdomen pelvis that showed the presence of cholelithiasis.    CT scan of the abdomen pelvis films and report reviewed from 1/3/2019.  Cholelithiasis was noted. No evidence of choledocholithiasis or acute cholecystitis.    Lab results reviewed from 1/3/2019.  CBC showed no evidence of leukocytosis or thrombocytopenia.  A mild normochromic normocytic anemia with a hemoglobin of 11.5 grams/deciliter was noted. Electrolytes were all in the range of normal except a mild hypocalcemia.  BUN and creatinine shows no evidence of renal dysfunction.  Glucose shows no suspicion diabetes.  Liver profile showed no evidence of hepatocellular disease or biliary obstruction.  Amylase and lipase showed no suggestion pancreatitis.  TSH on 1/2/2019 indicates adequate thyroid hormone replacement therapy.        Allergies & Meds:    No Known Allergies    Current Outpatient Medications   Medication Sig Dispense Refill    aspirin (ECOTRIN) 81 MG EC tablet Take 81 mg by mouth once daily.      atorvastatin (LIPITOR) 20 MG tablet Take 1 tablet (20 mg total) by mouth once daily. 90 tablet 1    FLUoxetine 10 MG capsule Take 1 capsule (10 mg total) by mouth once daily. 90 capsule 1    levonorgestrel-ethinyl estradiol (SEASONALE)  0.15 mg-30 mcg per tablet Take 1 tablet by mouth once daily. 90 tablet 1    levothyroxine (SYNTHROID) 125 MCG tablet Take 1 tablet (125 mcg total) by mouth once daily. 90 tablet 1    lisinopril-hydrochlorothiazide (PRINZIDE,ZESTORETIC) 10-12.5 mg per tablet Take 1 tablet by mouth once daily. 90 tablet 1    ondansetron (ZOFRAN) 4 MG tablet Take 1 tablet (4 mg total) by mouth every 6 (six) hours as needed for Nausea. 20 tablet 0    potassium chloride (K-TAB) 20 mEq Take 20 mEq by mouth once.      pseudoephedrine (SUDAFED) 30 MG tablet Take 1 tablet (30 mg total) by mouth every 4 (four) hours as needed for Congestion. 60 tablet 0    temazepam (RESTORIL) 15 mg Cap Take 1 capsule (15 mg total) by mouth nightly as needed. 30 capsule 5    valACYclovir (VALTREX) 500 MG tablet 500mg q12 hours x  3 days for recurrence. 60 tablet 11         PMFSHx:  Past Medical History:   Diagnosis Date    Hyperlipidemia     Hypertension     Pancreatitis     Thyroid disease        History reviewed. No pertinent surgical history.    Family History   Problem Relation Age of Onset    Glaucoma Mother     Heart disease Father     Hyperlipidemia Father     Hypertension Father     Diabetes Brother        Social History     Tobacco Use    Smoking status: Never Smoker    Smokeless tobacco: Never Used   Substance Use Topics    Alcohol use: Yes     Comment: OCCASIONAL SOCIAL DRINK    Drug use: No       Review of Systems   Constitutional: Negative for appetite change, chills, fatigue, fever and unexpected weight change.   HENT: Negative for congestion, dental problem, ear pain, mouth sores, postnasal drip, rhinorrhea, sore throat, tinnitus, trouble swallowing and voice change.    Eyes: Negative for photophobia, pain, discharge and visual disturbance.   Respiratory: Negative for cough, chest tightness, shortness of breath and wheezing.   Cardiovascular: Negative for chest pain, palpitations and leg swelling.   Gastrointestinal:  Negative for blood in stool and constipation.   Endocrine: Negative for cold intolerance, heat intolerance, polydipsia, polyphagia and polyuria.   Genitourinary: Negative for difficulty urinating, dysuria, flank pain, frequency, hematuria and urgency.   Musculoskeletal: Negative for arthralgias, joint swelling and myalgias.   Skin: Negative for color change and rash.   Allergic/Immunologic: Negative for immunocompromised state.   Neurological: Negative for dizziness, tremors, seizures, syncope, speech difficulty, weakness, numbness and headaches.   Hematological: Negative for adenopathy. Does not bruise/bleed easily.   Psychiatric/Behavioral: Negative for agitation, confusion, hallucinations, self-injury and suicidal ideas. The patient is not nervous/anxious.           Physical Exam   Constitutional: She is oriented to person, place, and time. She appears well-developed and well-nourished. She is active.  Non-toxic appearance. No distress. Body mass index is 30.22 kg/m².   HENT:   Head: Normocephalic and atraumatic.   Right Ear: Hearing and external ear normal. No drainage or tenderness.   Left Ear: Hearing and external ear normal. No drainage or tenderness.   Nose: Nose normal. No rhinorrhea. No epistaxis.   Mouth/Throat: Uvula is midline, oropharynx is clear and moist and mucous membranes are normal. Mucous membranes are not pale, not dry and not cyanotic. No oropharyngeal exudate.   Eyes: Conjunctivae and lids are normal. Pupils are equal, round, and reactive to light. Right eye exhibits no discharge and no exudate. Left eye exhibits no discharge and no exudate. Right conjunctiva is not injected. Right eye exhibits no nystagmus. Left eye exhibits no nystagmus.   Neck: Trachea normal, full passive range of motion without pain and phonation normal. No JVD present. Carotid bruit is not present. No tracheal deviation present. No thyroid mass and no thyromegaly present.   Cardiovascular: Normal rate, regular rhythm,  S1 normal, S2 normal, normal heart sounds and intact distal pulses. PMI is not displaced. Exam reveals no gallop and no friction rub.   No murmur heard.  Pulmonary/Chest: Effort normal and breath sounds normal. No accessory muscle usage. No respiratory distress. She exhibits no mass, no tenderness and no crepitus. Right breast exhibits no inverted nipple, no mass, no nipple discharge, no skin change and no tenderness. Left breast exhibits no inverted nipple, no mass, no nipple discharge, no skin change and no tenderness. Breasts are symmetrical.   Abdominal: Soft. Normal appearance and bowel sounds are normal. She exhibits no distension, no fluid wave, no abdominal bruit and no mass. There is no hepatosplenomegaly. There is no tenderness. There is no rebound, no guarding and negative Ochoa's sign. No hernia. Hernia confirmed negative in the right inguinal area and confirmed negative in the left inguinal area.   Genitourinary: Rectum normal and vagina normal. There is no rash or lesion on the right labia. There is no rash or lesion on the left labia. Right adnexum displays no mass. Left adnexum displays no mass. No vaginal discharge found.   Musculoskeletal:        Cervical back: Normal.        Thoracic back: Normal.        Lumbar back: Normal.        Right upper arm: Normal.        Left upper arm: Normal.        Right forearm: Normal.        Left forearm: Normal.        Right hand: Normal.        Left hand: Normal.        Right upper leg: Normal.        Left upper leg: Normal.        Right lower leg: Normal.        Left lower leg: Normal.        Right foot: Normal.        Left foot: Normal.   Lymphadenopathy:        Head (right side): No submental, no submandibular and no posterior auricular adenopathy present.        Head (left side): No submental, no submandibular and no posterior auricular adenopathy present.     She has no cervical adenopathy.     She has no axillary adenopathy.        Right: No inguinal and no  supraclavicular adenopathy present.        Left: No inguinal and no supraclavicular adenopathy present.   Neurological: She is alert and oriented to person, place, and time. She has normal strength. No cranial nerve deficit or sensory deficit. Coordination and gait normal. GCS eye subscore is 4. GCS verbal subscore is 5. GCS motor subscore is 6.   Skin: Skin is warm, dry and intact. No rash noted. No cyanosis. Nails show no clubbing.   Psychiatric: She has a normal mood and affect. Her speech is normal. Judgment and thought content normal. Her mood appears not anxious. Her affect is not inappropriate. She is not actively hallucinating. She does not exhibit a depressed mood.         Test Results:   See above    Assessment:       Postprandial right upper quadrant abdominal pain.  Abnormal CT scan of the gallbladder with evidence of cholelithiasis.  Differential diagnosis includes but is not limited to cholecystitis, gallbladder dyskinesia, ampullary dysfunction, cholelithiasis, ulcer disease, gastritis, IBS, IBD, etc.  Options include but not limited to laparoscopic cholecystectomy, laparotomy and cholecystectomy, endoscopy, radiologic studies, second opinion, observation, medical therapy, nonoperative therapy, symptomatic treatment, etc.  Multiple comorbid issues ( hypertension, hyperlipidemia, hypothyroidism ) increase the complexity of required perioperative evaluation & management, risks of complications, and likely will increase the difficulty and complexity of postoperative care, etc.  These issues must be factored in to preoperative evaluation and perioperative management.    1. Calculus of gallbladder without cholecystitis without obstruction    2. Essential hypertension    3. Hyperlipidemia, unspecified hyperlipidemia type    4. Acquired hypothyroidism        Plan:   Calculus of gallbladder without cholecystitis without obstruction  -     Case Request Operating Room: CHOLECYSTECTOMY-LAPAROSCOPIC  -      Comprehensive metabolic panel; Future; Expected date: 02/14/2019  -     Amylase; Future; Expected date: 02/14/2019  -     Lipase; Future; Expected date: 02/14/2019  -     CBC auto differential; Future; Expected date: 02/14/2019  -     Pregnancy, urine rapid; Future; Expected date: 02/14/2019  -     EKG 12-lead; Future; Expected date: 02/14/2019    Essential hypertension    Hyperlipidemia, unspecified hyperlipidemia type    Acquired hypothyroidism    Other orders  -     Full code; Standing  -     Place in Outpatient; Standing  -     Vital signs; Standing  -     Insert peripheral IV; Standing  -     lidocaine (PF) 10 mg/ml (1%) injection 10 mg  -     Verify beta-blocker dose taken within 24 hours if patient is prescribed beta-blocker; Standing  -     Height and weight; Standing  -     Verify discontinuation of antithrombotics; Standing  -     Verify blood consent; Standing  -     Verify consent; Standing  -     Void on call to Operating Room; Standing  -     Diet NPO; Standing  -     lactated ringers infusion  -     IP VTE LOW RISK PATIENT; Standing  -     Place YASMIN hose; Standing  -     Place sequential compression device; Standing  -     ertapenem (INVANZ) 1 g in sodium chloride 0.9% 100 mL IVPB        Follow-up around 3/14/2019 for routine postop evaluation.    Counseling/Medical Decision Making:  Razia Avila was counseled regarding the results of the evaluation thus far and the differential diagnosis including but not limited to: cholelithiasis, cholecystitis, choledocholithiasis, gastritis, duodenitis, ulcer disease, reflux disease, Sphincter of Oddi Dysfunction, pancreatitis, irritable bowel syndrome, inflammatory bowel disease, diverticular disease, neoplastic disease, infectious disease, ischemic disease, etc.  We also discussed all diagnostic and therapeutic options as well as the risks, benefits, possible complications, details of, and indications for each option.  Options discussed included but were not  limited to: endoscopy, laparoscopic cholecystectomy, conventional cholecystectomy, radiologic studies, second opinion, observation, empiric therapy, symptomatic therapy, stone dissolving therapy, lithotripsy, referral elsewhere for treatment, etc.  Possible complications of laparoscopic surgery discussed included but were not limited to: bleeding, infections, scars, chronic pain, nerve damage, internal injuries, bile duct injuries, inability to complete the operation laparoscopically / need to convert to an open technique, retained stones, persistent symptoms, need for additional operations or procedures, chronic diarrhea, weight gain, weight loss, etc.  Questions were solicited and answered.  [employer] publications were provided regarding endoscopy, gallbladder disease, laparoscopic surgery, etc.  I read the entire consent form to her verbatim. A copy of the consent form was provided for her review outside the office and hospital prior to the procedure.  Entire conversation was held in laymans terms.  All unfamiliar terms defined.  At the conclusion of the conversation she voiced complete understanding of all we discussed, satisfaction that all questions were answered, and that she felt fully informed and completely capable of making an informed decision.  She requests and desires to proceed with an attempted laparoscopic cholecystectomy.

## 2019-02-14 NOTE — LETTER
February 14, 2019      Lidya Ramirez, NP  [address]  [address]  [place] [address]           [facility] - General Surgery  [address] [place] [address]  Phone: [phone]  Fax: [phone]          Patient: Razia Avila   MR Number: [id number]   YOB: 1975   Date of Visit: 2/14/2019       Dear Lidya Ramirez:    Thank you for referring Razia Avila to me for evaluation. Attached you will find relevant portions of my assessment and plan of care.    If you have questions, please do not hesitate to call me. I look forward to following Razia Avila along with you.    Sincerely,    Chaz Rowley MD    Enclosure  CC:  No Recipients    If you would like to receive this communication electronically, please contact [email or web address] or [phone] to request more information on [employer] access.    For providers and/or their staff who would like to refer a patient to [facility], please contact us through our one-stop-shop provider referral line, [facility], at [phone].    If you feel you have received this communication in error or would no longer like to receive these types of communications, please e-mail [email or web address]

## 2019-02-19 ENCOUNTER — HOSPITAL ENCOUNTER (OUTPATIENT)
Dept: CARDIOLOGY | Facility: HOSPITAL | Age: 44
Discharge: HOME | End: 2019-02-19
Attending: SURGERY
Payer: COMMERCIAL

## 2019-02-19 ENCOUNTER — HOSPITAL ENCOUNTER (OUTPATIENT)
Dept: PREADMISSION TESTING | Facility: HOSPITAL | Age: 44
Discharge: HOME | End: 2019-02-19
Attending: SURGERY
Payer: COMMERCIAL

## 2019-02-19 VITALS — WEIGHT: 166 LBS | BODY MASS INDEX: 29.41 KG/M2 | HEIGHT: 63 IN

## 2019-02-19 DIAGNOSIS — K80.20 CALCULUS OF GALLBLADDER WITHOUT CHOLECYSTITIS WITHOUT OBSTRUCTION: ICD-10-CM

## 2019-02-19 PROCEDURE — 93010 EKG 12-LEAD: ICD-10-PCS | Mod: ,,, | Performed by: INTERNAL MEDICINE

## 2019-02-19 PROCEDURE — 93010 ELECTROCARDIOGRAM REPORT: CPT | Mod: ,,, | Performed by: INTERNAL MEDICINE

## 2019-02-19 PROCEDURE — 93005 ELECTROCARDIOGRAM TRACING: CPT

## 2019-02-19 RX ORDER — ATENOLOL 25 MG/1
25 TABLET ORAL DAILY
COMMUNITY
End: 2020-05-25 | Stop reason: SDUPTHER

## 2019-02-19 NOTE — DISCHARGE INSTRUCTIONS
HIBICLENS INSTRUCTIONS/INFORMATION           Chlorhexidine (HIBICLENS) topical antiseptic    What is this medicine?  CHLORHEXIDINE (klor HEX i omega) is used as a skin wound cleanser and a general skin cleanser. This medicine is also used as a surgical hand scrub and to cleanse the skin before surgery to help prevent infections.    How should I use this medicine?  This medicine is for external use only. Do not take by mouth. Follow the directions on the label or those given to you by your doctor or health care professional. Keep out of eyes, ears and mouth. This medicine should not be used as a preoperative skin preparation of the face or head.  Talk to your pediatrician regarding the use of this medicine in children. While this medicine may be used for children for selected conditions, precautions do apply.    What side effects may I notice from receiving this medicine?  Side effects that you should report to your doctor or other health care professional as soon as possible:  · allergic reactions like skin rash, itching or hives, swelling of the face, lips, or tongue  · breathing problems  · cough  Side effects that usually do not require medical attention (report to your doctor or other health care professional if they continue or are bothersome):  · increased sensitivity to the sun  · skin irritation  ·   What may interact with this medicine?  Interactions are not expected.    What if I miss a dose?  This does not apply; this medicine is not for regular use.    Where should I keep my medicine?  Keep out of the reach of children.  Store at room temperature between 15 and 30 degrees C (59 and 86 degrees F). Store away from direct light and heat. Do not freeze. Throw away any unused medicine after the expiration date.    What should I tell my health care provider before I take this medicine?  They need to know if you have any of the following conditions:  · any skin rashes or problems  · an unusual or allergic  reaction to chlorhexidine, other medicines, foods, dyes, or preservatives  · pregnant or trying to get pregnantbreast-feeding.    What should I watch for while using this medicine?  This medicine may cause severe allergic reactions. Notify a health care professional right away if you think you are having an allergic reaction.  Do not take this medicine by mouth. Avoid contact with your ears and eyes. If contact with the eyes occur, rinse the eyes well with plenty of cool tap water.  NOTE:This sheet is a summary. It may not cover all possible information. If you have questions about this medicine, talk to your doctor, pharmacist, or health care provider. Copyright© 2017 [employer]          HIBICLENS INSTRUCTIONS     You will take two showers with this soap. The first shower should be taken the night before your surgery/procedure and the second shower the morning of surgery/procedure.   Do not shave the area of your body where your surgery will be performed. Any new cut, abrasion or rash on your surgical site will need to be evaluated and may cause a delay in your procedure.   Turn water off before applying the hibiclens soap to prevent rinsing it off too soon. Apply the soap to your entire body from the jaw down, using a clean washcloth or your hands. Do not use hibiclens near your eyes, ears, nose or mouth. Wash thoroughly for three minutes, paying special attention to the area where your surgery will be performed. Do not scrub your skin too hard. Do not wash with your regular soap after using the hibiclens. Turn the water back on and rinse your body well.   Pat yourself dry with a fresh, clean, soft towel after each shower. Put on clean clothes or pajamas. Use freshly laundered bed linens for the first night.   Do not apply any lotions, perfumes or powders after use.       INSTRUCTIONS-  1. Arrive at the hospital for 11:00 am at the outpatient registration desk.  2. Do not eat or drink after  midnight.  3. You must have a ride home after your procedure.  4. Your recovery time is at least two hours.  5. Wear comfortable clothing/pants to the hospital.  6. Remove all contact lenses, retainers, dentures and jewelry before surgery.  7. Use hibiclens as instructed.   8. Do not shave your surgical area.

## 2019-02-19 NOTE — PRE-PROCEDURE INSTRUCTIONS
Here for PAT visit/anesthesia consult. Dr. Ruffin unavailable. Explained to pt she will be able to see the anesthesiologist in the morning prior to her surgery. V/u.

## 2019-02-20 ENCOUNTER — ANESTHESIA EVENT (OUTPATIENT)
Dept: SURGERY | Facility: HOSPITAL | Age: 44
End: 2019-02-20
Payer: COMMERCIAL

## 2019-02-20 ENCOUNTER — HOSPITAL ENCOUNTER (OUTPATIENT)
Facility: HOSPITAL | Age: 44
Discharge: HOME | End: 2019-02-20
Attending: SURGERY | Admitting: SURGERY
Payer: COMMERCIAL

## 2019-02-20 ENCOUNTER — ANESTHESIA (OUTPATIENT)
Dept: SURGERY | Facility: HOSPITAL | Age: 44
End: 2019-02-20
Payer: COMMERCIAL

## 2019-02-20 VITALS
SYSTOLIC BLOOD PRESSURE: 127 MMHG | DIASTOLIC BLOOD PRESSURE: 73 MMHG | OXYGEN SATURATION: 94 % | HEART RATE: 75 BPM | RESPIRATION RATE: 20 BRPM | TEMPERATURE: 98 F

## 2019-02-20 DIAGNOSIS — K80.20 CALCULUS OF GALLBLADDER WITHOUT CHOLECYSTITIS WITHOUT OBSTRUCTION: ICD-10-CM

## 2019-02-20 PROCEDURE — D9220A PRA ANESTHESIA: Mod: CRNA,,, | Performed by: NURSE ANESTHETIST, CERTIFIED REGISTERED

## 2019-02-20 PROCEDURE — D9220A PRA ANESTHESIA: ICD-10-PCS | Mod: CRNA,,, | Performed by: NURSE ANESTHETIST, CERTIFIED REGISTERED

## 2019-02-20 PROCEDURE — 25000003 PHARM REV CODE 250

## 2019-02-20 PROCEDURE — 36000709 HC OR TIME LEV III EA ADD 15 MIN: Performed by: SURGERY

## 2019-02-20 PROCEDURE — 88304 TISSUE EXAM BY PATHOLOGIST: CPT | Mod: 26,,, | Performed by: PATHOLOGY

## 2019-02-20 PROCEDURE — 63600175 PHARM REV CODE 636 W HCPCS: Performed by: SURGERY

## 2019-02-20 PROCEDURE — 37000009 HC ANESTHESIA EA ADD 15 MINS: Performed by: SURGERY

## 2019-02-20 PROCEDURE — 27201423 OPTIME MED/SURG SUP & DEVICES STERILE SUPPLY: Performed by: SURGERY

## 2019-02-20 PROCEDURE — 71000033 HC RECOVERY, INTIAL HOUR: Performed by: SURGERY

## 2019-02-20 PROCEDURE — 25000003 PHARM REV CODE 250: Performed by: SURGERY

## 2019-02-20 PROCEDURE — 25000003 PHARM REV CODE 250: Performed by: NURSE ANESTHETIST, CERTIFIED REGISTERED

## 2019-02-20 PROCEDURE — D9220A PRA ANESTHESIA: Mod: ANES,,, | Performed by: ANESTHESIOLOGY

## 2019-02-20 PROCEDURE — 88304 TISSUE SPECIMEN TO PATHOLOGY - SURGERY: ICD-10-PCS | Mod: 26,,, | Performed by: PATHOLOGY

## 2019-02-20 PROCEDURE — 37000008 HC ANESTHESIA 1ST 15 MINUTES: Performed by: SURGERY

## 2019-02-20 PROCEDURE — D9220A PRA ANESTHESIA: ICD-10-PCS | Mod: ANES,,, | Performed by: ANESTHESIOLOGY

## 2019-02-20 PROCEDURE — 71000016 HC POSTOP RECOV ADDL HR: Performed by: SURGERY

## 2019-02-20 PROCEDURE — 63600175 PHARM REV CODE 636 W HCPCS: Performed by: NURSE ANESTHETIST, CERTIFIED REGISTERED

## 2019-02-20 PROCEDURE — 47562 PR LAP,CHOLECYSTECTOMY: ICD-10-PCS | Mod: ,,, | Performed by: SURGERY

## 2019-02-20 PROCEDURE — 63600175 PHARM REV CODE 636 W HCPCS: Performed by: ANESTHESIOLOGY

## 2019-02-20 PROCEDURE — 36000708 HC OR TIME LEV III 1ST 15 MIN: Performed by: SURGERY

## 2019-02-20 PROCEDURE — 47562 LAPAROSCOPIC CHOLECYSTECTOMY: CPT | Mod: ,,, | Performed by: SURGERY

## 2019-02-20 PROCEDURE — 88304 TISSUE EXAM BY PATHOLOGIST: CPT | Performed by: PATHOLOGY

## 2019-02-20 PROCEDURE — 71000015 HC POSTOP RECOV 1ST HR: Performed by: SURGERY

## 2019-02-20 RX ORDER — GLYCOPYRROLATE 0.2 MG/ML
INJECTION INTRAMUSCULAR; INTRAVENOUS
Status: DISCONTINUED | OUTPATIENT
Start: 2019-02-20 | End: 2019-02-20

## 2019-02-20 RX ORDER — KETOROLAC TROMETHAMINE 30 MG/ML
INJECTION, SOLUTION INTRAMUSCULAR; INTRAVENOUS
Status: DISCONTINUED | OUTPATIENT
Start: 2019-02-20 | End: 2019-02-20

## 2019-02-20 RX ORDER — OXYCODONE AND ACETAMINOPHEN 10; 325 MG/1; MG/1
TABLET ORAL
Status: COMPLETED
Start: 2019-02-20 | End: 2019-02-20

## 2019-02-20 RX ORDER — ONDANSETRON 4 MG/1
4 TABLET, FILM COATED ORAL EVERY 6 HOURS PRN
Qty: 30 TABLET | Refills: 0 | Status: SHIPPED | OUTPATIENT
Start: 2019-02-20 | End: 2020-05-25 | Stop reason: SDUPTHER

## 2019-02-20 RX ORDER — MEPERIDINE HYDROCHLORIDE 50 MG/ML
INJECTION INTRAMUSCULAR; INTRAVENOUS; SUBCUTANEOUS
Status: DISCONTINUED | OUTPATIENT
Start: 2019-02-20 | End: 2019-02-20

## 2019-02-20 RX ORDER — SUCCINYLCHOLINE CHLORIDE 20 MG/ML
INJECTION INTRAMUSCULAR; INTRAVENOUS
Status: DISCONTINUED | OUTPATIENT
Start: 2019-02-20 | End: 2019-02-20

## 2019-02-20 RX ORDER — LIDOCAINE HYDROCHLORIDE 10 MG/ML
1 INJECTION, SOLUTION EPIDURAL; INFILTRATION; INTRACAUDAL; PERINEURAL ONCE
Status: DISCONTINUED | OUTPATIENT
Start: 2019-02-20 | End: 2019-02-20 | Stop reason: HOSPADM

## 2019-02-20 RX ORDER — CISATRACURIUM BESYLATE 2 MG/ML
INJECTION, SOLUTION INTRAVENOUS
Status: DISCONTINUED | OUTPATIENT
Start: 2019-02-20 | End: 2019-02-20

## 2019-02-20 RX ORDER — OXYCODONE AND ACETAMINOPHEN 10; 325 MG/1; MG/1
1 TABLET ORAL EVERY 6 HOURS PRN
Qty: 30 TABLET | Refills: 0 | Status: SHIPPED | OUTPATIENT
Start: 2019-02-20 | End: 2020-05-25 | Stop reason: SDUPTHER

## 2019-02-20 RX ORDER — DEXAMETHASONE SODIUM PHOSPHATE 4 MG/ML
INJECTION, SOLUTION INTRA-ARTICULAR; INTRALESIONAL; INTRAMUSCULAR; INTRAVENOUS; SOFT TISSUE
Status: DISCONTINUED | OUTPATIENT
Start: 2019-02-20 | End: 2019-02-20

## 2019-02-20 RX ORDER — SODIUM CHLORIDE, SODIUM LACTATE, POTASSIUM CHLORIDE, CALCIUM CHLORIDE 600; 310; 30; 20 MG/100ML; MG/100ML; MG/100ML; MG/100ML
125 INJECTION, SOLUTION INTRAVENOUS CONTINUOUS
Status: DISCONTINUED | OUTPATIENT
Start: 2019-02-20 | End: 2019-02-20 | Stop reason: HOSPADM

## 2019-02-20 RX ORDER — ONDANSETRON 2 MG/ML
4 INJECTION INTRAMUSCULAR; INTRAVENOUS DAILY PRN
Status: COMPLETED | OUTPATIENT
Start: 2019-02-20 | End: 2019-02-20

## 2019-02-20 RX ORDER — PROPOFOL 10 MG/ML
VIAL (ML) INTRAVENOUS
Status: DISCONTINUED | OUTPATIENT
Start: 2019-02-20 | End: 2019-02-20

## 2019-02-20 RX ORDER — SODIUM CHLORIDE, SODIUM LACTATE, POTASSIUM CHLORIDE, CALCIUM CHLORIDE 600; 310; 30; 20 MG/100ML; MG/100ML; MG/100ML; MG/100ML
INJECTION, SOLUTION INTRAVENOUS CONTINUOUS
Status: DISCONTINUED | OUTPATIENT
Start: 2019-02-20 | End: 2019-02-20 | Stop reason: HOSPADM

## 2019-02-20 RX ORDER — MIDAZOLAM HYDROCHLORIDE 1 MG/ML
INJECTION, SOLUTION INTRAMUSCULAR; INTRAVENOUS
Status: DISCONTINUED | OUTPATIENT
Start: 2019-02-20 | End: 2019-02-20

## 2019-02-20 RX ORDER — ONDANSETRON 2 MG/ML
INJECTION INTRAMUSCULAR; INTRAVENOUS
Status: DISCONTINUED | OUTPATIENT
Start: 2019-02-20 | End: 2019-02-20

## 2019-02-20 RX ORDER — MORPHINE SULFATE 2 MG/ML
2 INJECTION, SOLUTION INTRAMUSCULAR; INTRAVENOUS EVERY 5 MIN PRN
Status: DISCONTINUED | OUTPATIENT
Start: 2019-02-20 | End: 2019-02-20 | Stop reason: HOSPADM

## 2019-02-20 RX ORDER — BUPIVACAINE HYDROCHLORIDE AND EPINEPHRINE 5; 5 MG/ML; UG/ML
INJECTION, SOLUTION EPIDURAL; INTRACAUDAL; PERINEURAL
Status: DISCONTINUED | OUTPATIENT
Start: 2019-02-20 | End: 2019-02-20 | Stop reason: HOSPADM

## 2019-02-20 RX ORDER — OXYCODONE AND ACETAMINOPHEN 10; 325 MG/1; MG/1
1 TABLET ORAL ONCE
Status: COMPLETED | OUTPATIENT
Start: 2019-02-20 | End: 2019-02-20

## 2019-02-20 RX ADMIN — ONDANSETRON 4 MG: 2 INJECTION INTRAMUSCULAR; INTRAVENOUS at 03:02

## 2019-02-20 RX ADMIN — MORPHINE SULFATE 2 MG: 2 INJECTION, SOLUTION INTRAMUSCULAR; INTRAVENOUS at 03:02

## 2019-02-20 RX ADMIN — GLYCOPYRROLATE 0.2 MG: 0.2 INJECTION INTRAMUSCULAR; INTRAVENOUS at 02:02

## 2019-02-20 RX ADMIN — OXYCODONE HYDROCHLORIDE AND ACETAMINOPHEN 1 TABLET: 10; 325 TABLET ORAL at 03:02

## 2019-02-20 RX ADMIN — MIDAZOLAM HYDROCHLORIDE 2 MG: 1 INJECTION, SOLUTION INTRAMUSCULAR; INTRAVENOUS at 01:02

## 2019-02-20 RX ADMIN — PROPOFOL 200 MG: 10 INJECTION, EMULSION INTRAVENOUS at 01:02

## 2019-02-20 RX ADMIN — SODIUM CHLORIDE, POTASSIUM CHLORIDE, SODIUM LACTATE AND CALCIUM CHLORIDE: 600; 310; 30; 20 INJECTION, SOLUTION INTRAVENOUS at 11:02

## 2019-02-20 RX ADMIN — SUCCINYLCHOLINE CHLORIDE 120 MG: 20 INJECTION, SOLUTION INTRAMUSCULAR; INTRAVENOUS at 01:02

## 2019-02-20 RX ADMIN — OXYCODONE AND ACETAMINOPHEN 1 TABLET: 10; 325 TABLET ORAL at 03:02

## 2019-02-20 RX ADMIN — DEXAMETHASONE SODIUM PHOSPHATE 4 MG: 4 INJECTION, SOLUTION INTRAMUSCULAR; INTRAVENOUS at 02:02

## 2019-02-20 RX ADMIN — ERTAPENEM SODIUM 1 G: 1 INJECTION, POWDER, LYOPHILIZED, FOR SOLUTION INTRAMUSCULAR; INTRAVENOUS at 02:02

## 2019-02-20 RX ADMIN — MEPERIDINE HYDROCHLORIDE 50 MG: 50 INJECTION INTRAMUSCULAR; INTRAVENOUS; SUBCUTANEOUS at 01:02

## 2019-02-20 RX ADMIN — CISATRACURIUM BESYLATE 6 MG: 2 INJECTION INTRAVENOUS at 02:02

## 2019-02-20 RX ADMIN — ONDANSETRON 4 MG: 2 INJECTION INTRAMUSCULAR; INTRAVENOUS at 02:02

## 2019-02-20 RX ADMIN — KETOROLAC TROMETHAMINE 30 MG: 30 INJECTION, SOLUTION INTRAMUSCULAR at 02:02

## 2019-02-20 RX ADMIN — SODIUM CHLORIDE, POTASSIUM CHLORIDE, SODIUM LACTATE AND CALCIUM CHLORIDE: 600; 310; 30; 20 INJECTION, SOLUTION INTRAVENOUS at 02:02

## 2019-02-20 NOTE — ANESTHESIA POSTPROCEDURE EVALUATION
Anesthesia Post Evaluation    Patient: Razia Avila    Procedure(s) Performed: Procedure(s) (LRB):  CHOLECYSTECTOMY-LAPAROSCOPIC (N/A)    Final Anesthesia Type: general  Patient location during evaluation: PACU  Patient participation: Yes- Able to Participate  Level of consciousness: awake and awake and alert  Post-procedure vital signs: reviewed and stable  Pain management: adequate  Airway patency: patent  PONV status at discharge: No PONV  Anesthetic complications: no      Cardiovascular status: blood pressure returned to baseline  Respiratory status: unassisted and spontaneous ventilation  Hydration status: euvolemic  Follow-up not needed.        Visit Vitals  /67   Pulse 88   Temp 36.7 °C (98.1 °F) (Oral)   Resp 11   LMP 02/02/2019 (Exact Date)   SpO2 95%   Breastfeeding? No       Pain/Lou Score: Pain Rating Prior to Med Admin: 6 (2/20/2019  3:52 PM)  Lou Score: 9 (2/20/2019  4:15 PM)

## 2019-02-20 NOTE — DISCHARGE SUMMARY
[facility]  Discharge Note  Short Stay    Admit Date: 2/20/2019    Discharge Date and Time: No discharge date for patient encounter.     Attending Physician: Chaz Rowley MD     Discharge Provider: Chaz Rowley    Diagnoses:  Active Hospital Problems    Diagnosis  POA    *Calculus of gallbladder without cholecystitis without obstruction [K80.20]  Yes      Resolved Hospital Problems   No resolved problems to display.       Discharged Condition: good    Hospital Course: Patient was admitted for an outpatient procedure and tolerated the procedure well with no complications.    Final Diagnoses: Same as principal problem.    Disposition: Home or Self Care    Follow up/Patient Instructions:    Medications:  Reconciled Home Medications:      Medication List      START taking these medications    ondansetron 4 MG tablet  Commonly known as:  ZOFRAN  Take 1 tablet (4 mg total) by mouth every 6 (six) hours as needed.     oxyCODONE-acetaminophen  mg per tablet  Commonly known as:  PERCOCET  Take 1 tablet by mouth every 6 (six) hours as needed for Pain.        CONTINUE taking these medications    aspirin 81 MG EC tablet  Commonly known as:  ECOTRIN  Take 81 mg by mouth once daily.     atenolol 25 MG tablet  Commonly known as:  TENORMIN  Take 25 mg by mouth once daily.     atorvastatin 20 MG tablet  Commonly known as:  LIPITOR  Take 1 tablet (20 mg total) by mouth once daily.     FLUoxetine 10 MG capsule  Take 1 capsule (10 mg total) by mouth once daily.     levonorgestrel-ethinyl estradiol 0.15 mg-30 mcg per tablet  Commonly known as:  SEASONALE  Take 1 tablet by mouth once daily.     levothyroxine 125 MCG tablet  Commonly known as:  SYNTHROID  Take 1 tablet (125 mcg total) by mouth once daily.     lisinopril-hydrochlorothiazide 10-12.5 mg per tablet  Commonly known as:  PRINZIDE,ZESTORETIC  Take 1 tablet by mouth once daily.     potassium chloride 20 mEq  Commonly known as:  K-TAB  Take 20 mEq by mouth  daily as needed.     temazepam 15 mg Cap  Commonly known as:  RESTORIL  Take 1 capsule (15 mg total) by mouth nightly as needed.     valACYclovir 500 MG tablet  Commonly known as:  VALTREX  500mg q12 hours x  3 days for recurrence.          Discharge Procedure Orders   Diet Adult Regular     Order Specific Question Answer Comments   Additional restrictions: Low Chol/Sat Fat      No driving until:     Order Specific Question Answer Comments   Date: 3/11/2019      Other restrictions (specify):   Scheduling Instructions: No bending, lifting, pushing, pulling, climbing, driving, etc.  Nothing even the least bit exertional or strenuous.  Showers permissible.  No baths.     Follow-up Information     Chaz Rowley MD. Schedule an appointment as soon as possible for a visit in 2 weeks.    Specialty:  General Surgery  Why:  Routine Postop Visit  Contact information:  [address]  [place] [facility] [address] [phone]                   Discharge Procedure Orders (must include Diet, Follow-up, Activity):   Discharge Procedure Orders (must include Diet, Follow-up, Activity)   Diet Adult Regular     Order Specific Question Answer Comments   Additional restrictions: Low Chol/Sat Fat      No driving until:     Order Specific Question Answer Comments   Date: 3/11/2019      Other restrictions (specify):   Scheduling Instructions: No bending, lifting, pushing, pulling, climbing, driving, etc.  Nothing even the least bit exertional or strenuous.  Showers permissible.  No baths.

## 2019-02-20 NOTE — BRIEF OP NOTE
[facility] - Periop Services  General Surgery  Brief Op Note  02/20/2019    Patient Name: Razia Avila  MRN: [id number]  Admission Date: 2/20/2019      Preop Diagnosis:  Chronic cholecystitis with cholelithiasis    Postop Diagnosis:  Same    Procedure:  Laparoscopic cholecystectomy    Surgeon:  Amrik GROVE    Assistant:  None    Anesthesia:  General endotracheal    EBL:  Less than 10 cc    Findings:  Above    Specimens:  Gallbladder with stones    Complications:  None    Disposition:  PACU stable      Chaz Rowley MD

## 2019-02-20 NOTE — ANESTHESIA PREPROCEDURE EVALUATION
02/20/2019  Razia Avila is a 43 y.o., female.    Pre-op Assessment    I have reviewed the Patient Summary Reports.    I have reviewed the Nursing Notes.   I have reviewed the Medications.     Review of Systems  Anesthesia Hx:  No problems with previous Anesthesia  Neg history of prior surgery. Denies Family Hx of Anesthesia complications.   Denies Personal Hx of Anesthesia complications.   Social:  No Alcohol Use    Hematology/Oncology:  Hematology Normal   Oncology Normal     EENT/Dental:EENT/Dental Normal   Cardiovascular:   Hypertension, well controlled    Pulmonary:  Pulmonary Normal    Renal/:  Renal/ Normal     Hepatic/GI:  Hepatic/GI Normal    Musculoskeletal:  Musculoskeletal Normal    Neurological:  Neurology Normal    Endocrine:   Hypothyroidism    Dermatological:  Skin Normal    Psych:  Psychiatric Normal           Physical Exam  General:  Well nourished    Airway/Jaw/Neck:  AIRWAY FINDINGS: Normal      Eyes/Ears/Nose:  EYES/EARS/NOSE FINDINGS: Normal   Dental:  DENTAL FINDINGS: Normal   Chest/Lungs:  Chest/Lungs Clear    Heart/Vascular:  Heart Findings: Normal Heart murmur: negative Vascular Findings: Normal    Abdomen:  Abdomen Findings: Normal    Musculoskeletal:  Musculoskeletal Findings: Normal   Skin:  Skin Findings: Normal         Anesthesia Plan  Type of Anesthesia, risks & benefits discussed:  Anesthesia Type:  general  Patient's Preference:   Intra-op Monitoring Plan: standard ASA monitors  Intra-op Monitoring Plan Comments:   Post Op Pain Control Plan:   Post Op Pain Control Plan Comments:   Induction:   IV  Beta Blocker:  Patient is on a Beta-Blocker and has received one dose within the past 24 hours (No further documentation required).       Informed Consent: Patient understands risks and agrees with Anesthesia plan.  Questions answered. Anesthesia consent signed with  patient.  ASA Score: 2     Day of Surgery Review of History & Physical:    H&P update referred to the provider.

## 2019-02-20 NOTE — TRANSFER OF CARE
Anesthesia Transfer of Care Note    Patient: Razia Avila    Procedure(s) Performed: Procedure(s) (LRB):  CHOLECYSTECTOMY-LAPAROSCOPIC (N/A)    Patient location: PACU    Anesthesia Type: general    Transport from OR: Transported from OR on room air with adequate spontaneous ventilation    Post pain: adequate analgesia    Post assessment: no apparent anesthetic complications and tolerated procedure well    Post vital signs: stable    Level of consciousness: awake, alert and oriented    Nausea/Vomiting: no nausea/vomiting    Complications: none    Transfer of care protocol was followed      Last vitals:   Visit Vitals  /70 (BP Location: Left arm, Patient Position: Lying)   Pulse 69   Temp 36.7 °C (98.1 °F) (Oral)   Resp 18   LMP 02/02/2019 (Exact Date)   SpO2 98%   Breastfeeding? No

## 2019-02-20 NOTE — OP NOTE
DATE OF PROCEDURE:  02/20/2019.    SURGEON:  Chaz Rowley M.D.    ASSISTANT:  None.    PREOPERATIVE DIAGNOSIS:  Chronic cholecystitis with cholelithiasis.    POSTOPERATIVE DIAGNOSIS:  Chronic cholecystitis with cholelithiasis.    SURGICAL PROCEDURE PERFORMED:  Laparoscopic cholecystectomy.    INDICATIONS FOR PROCEDURE:  A 43-year-old female seen in the office with  postprandial abdominal pain.  CT scan confirmed the presence of  cholelithiasis.    SURGICAL FINDINGS:  The patient had severe adhesive disease of omentum to  the inferior surface of the gallbladder from the dome to the infundibulum.   The antrum of the stomach was also adherent to the body and infundibulum of  the gallbladder medial wall.  Cystic duct, common bile duct, common hepatic  duct, cystic artery, right hepatic artery were all anatomically  unremarkable.  No evidence of any dilated ducts.  No other abdominal  pathology was appreciated.  Stones were present within the gallbladder  lumen.    PROCEDURE IN DETAL:  Informed consent process was reviewed again in  preoperative holding including the risks, benefits, possible complications  of, details of, and indications for each option.  The patient again  verbalized understanding and consent.  Preoperative IV antibiotics were  administered.  The patient was taken to the operating room.  A Time Out was  completed.  The correct patient, procedure, operative site, position,  consent, allergies, and administration of antibiotics were confirmed by all  members of the operating team.  General anesthesia was then induced without  difficulty or complications. The abdomen was then prepped and draped in the  usual sterile fashion with Betadine scrub and paint solution and sterile  towels and drapes.  Marcaine 0.5% with epinephrine was infiltrated into  each port site as each port was placed for postoperative analgesia.  A  total of 20 mL was used.  A 1 cm periumbilical skin incision was made with  a #15  blade.  Subcutaneous tissue was dissected bluntly. Anterior surface  of the abdominal fascia was exposed.  Traction sutures were placed on  either side of the fascial midline.  The fascia was then incised in the  midline with a #15 blade.  Peritoneum was entered bluntly. Inner surface of  the anterior abdominal wall was examined digitally.  No adhesions or other  pathology encountered.  A 12 mm blunt-tipped Austin trocar was introduced  without difficulty or resistance.  Pneumoperitoneum was established in a  slow, gentle, stepwise, sequential fashion to a maximum of 15 mmHg.  Pauses  were made at the 5 mm and 10 mm levels to ensure hemodynamic stability.  Telescope was inserted, and the abdominal cavity was inspected.  There were  no evident intraabdominal or retroperitoneal injuries.  Three 5 mm trocars  were then inserted through separate skin incisions, all under laparoscopic  visualization, without difficulty, complications, or resistance (1 in the  epigastrium, 2 in the right upper quadrant). Anterior and cephalad traction  was applied to the dome of the gallbladder.  Adhesions were lysed as  necessary with blunt and electrocautery dissection. Inferior and lateral  traction was applied to the infundibulum.  Blunt dissection was carried out  in the triangle of Calot to identify the cystic duct, common bile duct,  common hepatic duct, cystic artery, and the right hepatic artery.  After  all these structures were clearly identified, the cystic duct was clipped  twice proximally and once distally.  The cystic artery was clipped twice  proximally and once distally.  These structures were then divided between  the proximal and distal clips.  Gallbladder was then freed from its  attachments to the liver with blunt and electrocautery dissection.  Just  prior to completing the amputation of the gallbladder, tension was released  on the gallbladder and hemostasis of the gallbladder bed was ensured.   Gallbladder was  then completely freed from the liver.  Camera was moved to  the epigastric port.  Gallbladder was delivered through the umbilical port.   Umbilical port was replaced.  Pneumoperitoneum was reestablished.  Camera  was moved back to the umbilical port. Abdomen was irrigated copiously.  All  irrigation solution was evacuated. Hemostasis was ensured throughout.  The  three 5 mm ports were removed, and hemostasis was ensured at these sites by  careful inspection of the internal and external surfaces.  Pneumoperitoneum  was then completely evacuated, and the umbilical port was removed.   Hemostasis was ensured at this site by careful inspection. Peritoneum at  the umbilical site was closed with 3-0 Vicryl, fascia with 0 Vicryl, and  subcutaneous tissue with 3-0. Skin was then closed at all sites with 4-0  undyed subcuticular Vicryl suture.  Sterile Dermabond dressings were  applied.  The patient was emerged from the anesthetic without difficulty or  complications and transported to the recovery room in good condition. All  counts were correct.  No evident surgical complications.  Estimated blood  loss as noted above.    SPECIMEN:  Gallbladder with stones.    COMPLICATIONS:  None.    ESTIMATED BLOOD LOSS:  Less than 10 mL.    DISPOSITION:  PACU, stable.        BTA/IN dd: 02/20/2019 15:36:44 (CST)   td: 02/20/2019 16:27:01 (CST)  Doc ID #[id number]   Job ID #[id number]    CC:

## 2019-02-20 NOTE — PLAN OF CARE
Patient awake, alert, and oriented.  Patient complains of slight nausea and headache. Patient tolerating PO intake.

## 2019-02-20 NOTE — INTERVAL H&P NOTE
The patient has been examined and the H&P has been reviewed:     I concur with the findings and no changes have occurred since H&P was written.     Lab results reviewed from 2/19/2019.  CBC showed no evidence of leukocytosis, anemia, or thrombocytopenia.  Electrolytes revealed a very mild hypocarbia, otherwise all electrolytes were in the range of normal. BUN and creatinine showed no evidence of renal dysfunction.  Glucose showed no suspicion diabetes.  Liver profile showed no evidence of hepatocellular disease or biliary obstruction. Amylase and lipase showed no evidence of pancreatitis.  Pregnancy test was negative.    EKG reviewed from 2/19/2019.  Tracing showed a normal sinus rhythm with no evidence of any ischemic changes.    Chest x-ray films and report were reviewed from 1/1/2019 with no acute cardiopulmonary pathology evident.  Abdominal ultrasound films and report were reviewed from 1/2/2019 with evidence of biliary sludge but no evidence of dilated bile ducts.  CT scan of the abdomen pelvis films and report were reviewed from 1/3/2019.  Calcified cholelithiasis was noted along with diverticulosis and simple bilateral renal cysts.  No evidence of cholecystitis, choledocholithiasis, dilated bile ducts.    Surgery risks, benefits and alternative options discussed and understood by patient/family.              There are no hospital problems to display for this patient.

## 2019-02-22 ENCOUNTER — PATIENT MESSAGE (OUTPATIENT)
Dept: SURGERY | Facility: CLINIC | Age: 44
End: 2019-02-22

## 2019-02-25 ENCOUNTER — PATIENT MESSAGE (OUTPATIENT)
Dept: FAMILY MEDICINE | Facility: CLINIC | Age: 44
End: 2019-02-25

## 2019-03-01 ENCOUNTER — PATIENT MESSAGE (OUTPATIENT)
Dept: SURGERY | Facility: CLINIC | Age: 44
End: 2019-03-01

## 2019-03-07 ENCOUNTER — OFFICE VISIT (OUTPATIENT)
Dept: SURGERY | Facility: CLINIC | Age: 44
End: 2019-03-07
Payer: COMMERCIAL

## 2019-03-07 VITALS
OXYGEN SATURATION: 97 % | HEIGHT: 63 IN | RESPIRATION RATE: 18 BRPM | WEIGHT: 172 LBS | TEMPERATURE: 98 F | DIASTOLIC BLOOD PRESSURE: 73 MMHG | SYSTOLIC BLOOD PRESSURE: 133 MMHG | HEART RATE: 66 BPM | BODY MASS INDEX: 30.48 KG/M2

## 2019-03-07 DIAGNOSIS — Z48.89 ENCOUNTER FOR POSTOPERATIVE CARE: Primary | ICD-10-CM

## 2019-03-07 PROCEDURE — 99024 PR POST-OP FOLLOW-UP VISIT: ICD-10-PCS | Mod: S$GLB,,, | Performed by: SURGERY

## 2019-03-07 PROCEDURE — 99024 POSTOP FOLLOW-UP VISIT: CPT | Mod: S$GLB,,, | Performed by: SURGERY

## 2019-03-07 NOTE — PROGRESS NOTES
"Subjective:       Patient ID: Razia Avila is a 43 y.o. female.    Chief Complaint: Post-op Evaluation (Lap Choleystectomy 2/2019)      HPI:  Ms. Avila presents today after a recent uncomplicated laparoscopic cholecystectomy with no complaints.  She is not experiencing any pain, fevers, chills, night sweats, nausea, vomiting, diarrhea, constipation, jaundice, biliuria, acholia, etc.  Appetite is excellent.  Activity tolerance has returned to normal.  Overall she feels "great".  No wound concerns.  No wound redness, drainage, swelling, odor, etc.  All preop symptoms have resolved.        Allergies & Meds:  Review of patient's allergies indicates:  No Known Allergies    Current Outpatient Medications   Medication Sig Dispense Refill    aspirin (ECOTRIN) 81 MG EC tablet Take 81 mg by mouth once daily.      atenolol (TENORMIN) 25 MG tablet Take 25 mg by mouth once daily.      atorvastatin (LIPITOR) 20 MG tablet Take 1 tablet (20 mg total) by mouth once daily. 90 tablet 1    FLUoxetine 10 MG capsule Take 1 capsule (10 mg total) by mouth once daily. 90 capsule 1    levonorgestrel-ethinyl estradiol (SEASONALE) 0.15 mg-30 mcg per tablet Take 1 tablet by mouth once daily. 90 tablet 1    levothyroxine (SYNTHROID) 125 MCG tablet Take 1 tablet (125 mcg total) by mouth once daily. 90 tablet 1    lisinopril-hydrochlorothiazide (PRINZIDE,ZESTORETIC) 10-12.5 mg per tablet Take 1 tablet by mouth once daily. 90 tablet 1    ondansetron (ZOFRAN) 4 MG tablet Take 1 tablet (4 mg total) by mouth every 6 (six) hours as needed. 30 tablet 0    oxyCODONE-acetaminophen (PERCOCET)  mg per tablet Take 1 tablet by mouth every 6 (six) hours as needed for Pain. 30 tablet 0    potassium chloride (K-TAB) 20 mEq Take 20 mEq by mouth daily as needed.       temazepam (RESTORIL) 15 mg Cap Take 1 capsule (15 mg total) by mouth nightly as needed. 30 capsule 5    valACYclovir (VALTREX) 500 MG tablet 500mg q12 hours x  3 days for " recurrence. 60 tablet 11     No current facility-administered medications for this visit.        PMFSHx:  Past medical history, surgical history, family history, social history reviewed and no changes noted.        Review of Systems   Constitutional: Negative for appetite change, chills, fatigue, fever and unexpected weight change.   HENT: Negative for congestion, dental problem, ear pain, mouth sores, postnasal drip, rhinorrhea, sore throat, tinnitus, trouble swallowing and voice change.    Eyes: Negative for photophobia, pain, discharge and visual disturbance.   Respiratory: Negative for cough, chest tightness, shortness of breath and wheezing.    Cardiovascular: Negative for chest pain, palpitations and leg swelling.   Gastrointestinal: Negative for abdominal pain, blood in stool, constipation, diarrhea, nausea and vomiting.   Endocrine: Negative for cold intolerance, heat intolerance, polydipsia, polyphagia and polyuria.   Genitourinary: Negative for difficulty urinating, dysuria, flank pain, frequency, hematuria and urgency.   Musculoskeletal: Negative for arthralgias, joint swelling and myalgias.   Skin: Negative for color change and rash.   Allergic/Immunologic: Negative for immunocompromised state.   Neurological: Negative for dizziness, tremors, seizures, syncope, speech difficulty, weakness, numbness and headaches.   Hematological: Negative for adenopathy. Does not bruise/bleed easily.   Psychiatric/Behavioral: Negative for agitation, confusion, hallucinations, self-injury and suicidal ideas. The patient is not nervous/anxious.        Objective:      Physical Exam   Constitutional: Vital signs are normal. She is active. No distress (no acute distress).   Cardiovascular: Normal rate, regular rhythm and normal heart sounds.   Pulmonary/Chest: Effort normal and breath sounds normal. Respiratory distress: no respiratory distress.   Abdominal: Soft. Normal appearance and bowel sounds are normal. Distention:  nondistended. Tenderness: nontender.   Neurological: She is alert.   Skin: Skin is warm, dry and intact.   Incisions healing well without erythema, edema, exudate, induration, fluctuance, crepitus, necrosis, discoloration, separation         Test Results:  Pathology report reviewed from 2/20/2019.  Chronic cholecystitis confirmed.  No report regarding malignancy, atypia, dysplasia, etc.    Assessment:       Satisfactory postoperative recovery.  No evident complications.  Wounds healed.  1. Encounter for postoperative care        Plan:   Encounter for postoperative care        Follow-up for any concerns or questions as needed, resume care with PCP.

## 2019-03-13 ENCOUNTER — PATIENT MESSAGE (OUTPATIENT)
Dept: FAMILY MEDICINE | Facility: CLINIC | Age: 44
End: 2019-03-13

## 2019-03-14 ENCOUNTER — PATIENT MESSAGE (OUTPATIENT)
Dept: FAMILY MEDICINE | Facility: CLINIC | Age: 44
End: 2019-03-14

## 2019-03-14 RX ORDER — METRONIDAZOLE 500 MG/1
500 TABLET ORAL EVERY 8 HOURS
Qty: 30 TABLET | Refills: 0 | Status: SHIPPED | OUTPATIENT
Start: 2019-03-14 | End: 2019-06-24 | Stop reason: SDUPTHER

## 2019-03-18 ENCOUNTER — PATIENT MESSAGE (OUTPATIENT)
Dept: FAMILY MEDICINE | Facility: CLINIC | Age: 44
End: 2019-03-18

## 2019-05-10 RX ORDER — TEMAZEPAM 15 MG/1
15 CAPSULE ORAL NIGHTLY PRN
Qty: 30 CAPSULE | Refills: 0 | Status: CANCELLED | OUTPATIENT
Start: 2019-05-10

## 2019-05-23 RX ORDER — LEVOTHYROXINE SODIUM 125 UG/1
TABLET ORAL
Qty: 90 TABLET | Refills: 0 | Status: SHIPPED | OUTPATIENT
Start: 2019-05-23 | End: 2020-05-25 | Stop reason: SDUPTHER

## 2019-06-19 ENCOUNTER — PATIENT MESSAGE (OUTPATIENT)
Dept: FAMILY MEDICINE | Facility: CLINIC | Age: 44
End: 2019-06-19

## 2019-06-23 ENCOUNTER — PATIENT MESSAGE (OUTPATIENT)
Dept: FAMILY MEDICINE | Facility: CLINIC | Age: 44
End: 2019-06-23

## 2019-06-24 RX ORDER — METRONIDAZOLE 500 MG/1
500 TABLET ORAL EVERY 8 HOURS
Qty: 30 TABLET | Refills: 0 | Status: SHIPPED | OUTPATIENT
Start: 2019-06-24 | End: 2019-07-04

## 2019-06-25 RX ORDER — VALACYCLOVIR HYDROCHLORIDE 500 MG/1
TABLET, FILM COATED ORAL
Qty: 60 TABLET | Refills: 11 | Status: SHIPPED | OUTPATIENT
Start: 2019-06-25 | End: 2020-05-25 | Stop reason: SDUPTHER

## 2019-06-25 RX ORDER — TEMAZEPAM 15 MG/1
15 CAPSULE ORAL NIGHTLY PRN
Qty: 30 CAPSULE | Refills: 5 | OUTPATIENT
Start: 2019-06-25

## 2019-07-17 ENCOUNTER — PATIENT MESSAGE (OUTPATIENT)
Dept: FAMILY MEDICINE | Facility: CLINIC | Age: 44
End: 2019-07-17

## 2019-07-17 ENCOUNTER — TELEPHONE (OUTPATIENT)
Dept: FAMILY MEDICINE | Facility: CLINIC | Age: 44
End: 2019-07-17

## 2019-07-17 DIAGNOSIS — F32.A ANXIETY AND DEPRESSION: Primary | ICD-10-CM

## 2019-07-17 DIAGNOSIS — F41.9 ANXIETY AND DEPRESSION: Primary | ICD-10-CM

## 2019-07-17 RX ORDER — BUPROPION HYDROCHLORIDE 75 MG/1
75 TABLET ORAL 2 TIMES DAILY
Qty: 60 TABLET | Refills: 2 | Status: SHIPPED | OUTPATIENT
Start: 2019-07-17 | End: 2019-10-21 | Stop reason: SDUPTHER

## 2019-07-17 NOTE — TELEPHONE ENCOUNTER
Wean prozac by taking one every day and week 3 every 3 rd day to wean. Start Wellbutrin now. Sent to pharmacy

## 2019-07-18 RX ORDER — TEMAZEPAM 15 MG/1
CAPSULE ORAL
Qty: 30 CAPSULE | Refills: 0 | OUTPATIENT
Start: 2019-07-18

## 2019-07-18 NOTE — TELEPHONE ENCOUNTER
Restoril denied as pt needs appt. She has not been seen in almost 6 mo with recent med request. Offer appt please. ty

## 2019-08-21 RX ORDER — LISINOPRIL AND HYDROCHLOROTHIAZIDE 10; 12.5 MG/1; MG/1
TABLET ORAL
Qty: 90 TABLET | Refills: 0 | Status: SHIPPED | OUTPATIENT
Start: 2019-08-21 | End: 2019-11-22 | Stop reason: SDUPTHER

## 2019-08-21 RX ORDER — ATORVASTATIN CALCIUM 20 MG/1
TABLET, FILM COATED ORAL
Qty: 90 TABLET | Refills: 0 | Status: SHIPPED | OUTPATIENT
Start: 2019-08-21 | End: 2019-11-25

## 2019-08-26 RX ORDER — LEVONORGESTREL AND ETHINYL ESTRADIOL 0.15-0.03
1 KIT ORAL DAILY
Qty: 90 TABLET | Refills: 1 | Status: SHIPPED | OUTPATIENT
Start: 2019-08-26 | End: 2020-05-25 | Stop reason: SDUPTHER

## 2019-10-21 DIAGNOSIS — F41.9 ANXIETY AND DEPRESSION: ICD-10-CM

## 2019-10-21 DIAGNOSIS — F32.A ANXIETY AND DEPRESSION: ICD-10-CM

## 2019-10-23 RX ORDER — BUPROPION HYDROCHLORIDE 75 MG/1
TABLET ORAL
Qty: 60 TABLET | Refills: 5 | Status: SHIPPED | OUTPATIENT
Start: 2019-10-23 | End: 2020-05-25

## 2019-11-25 RX ORDER — ATORVASTATIN CALCIUM 20 MG/1
TABLET, FILM COATED ORAL
Qty: 90 TABLET | Refills: 3 | Status: SHIPPED | OUTPATIENT
Start: 2019-11-25 | End: 2020-05-25 | Stop reason: SDUPTHER

## 2019-11-25 RX ORDER — LISINOPRIL AND HYDROCHLOROTHIAZIDE 10; 12.5 MG/1; MG/1
TABLET ORAL
Qty: 90 TABLET | Refills: 3 | Status: SHIPPED | OUTPATIENT
Start: 2019-11-25 | End: 2020-05-25 | Stop reason: SDUPTHER

## 2019-11-25 RX ORDER — ATORVASTATIN CALCIUM 20 MG/1
TABLET, FILM COATED ORAL
Qty: 90 TABLET | Refills: 0 | OUTPATIENT
Start: 2019-11-25

## 2019-11-25 RX ORDER — ATORVASTATIN CALCIUM 20 MG/1
TABLET, FILM COATED ORAL
Qty: 90 TABLET | Refills: 0 | Status: CANCELLED | OUTPATIENT
Start: 2019-11-25

## 2020-05-05 ENCOUNTER — PATIENT MESSAGE (OUTPATIENT)
Dept: ADMINISTRATIVE | Facility: HOSPITAL | Age: 45
End: 2020-05-05

## 2020-05-20 ENCOUNTER — PATIENT MESSAGE (OUTPATIENT)
Dept: ADMINISTRATIVE | Facility: HOSPITAL | Age: 45
End: 2020-05-20

## 2020-05-25 ENCOUNTER — HOSPITAL ENCOUNTER (OUTPATIENT)
Dept: RADIOLOGY | Facility: HOSPITAL | Age: 45
Discharge: HOME | End: 2020-05-25
Attending: NURSE PRACTITIONER
Payer: COMMERCIAL

## 2020-05-25 ENCOUNTER — OFFICE VISIT (OUTPATIENT)
Dept: FAMILY MEDICINE | Facility: CLINIC | Age: 45
End: 2020-05-25
Payer: COMMERCIAL

## 2020-05-25 ENCOUNTER — TELEPHONE (OUTPATIENT)
Dept: FAMILY MEDICINE | Facility: CLINIC | Age: 45
End: 2020-05-25

## 2020-05-25 VITALS
WEIGHT: 164 LBS | SYSTOLIC BLOOD PRESSURE: 119 MMHG | HEART RATE: 85 BPM | TEMPERATURE: 97 F | BODY MASS INDEX: 29.06 KG/M2 | RESPIRATION RATE: 16 BRPM | DIASTOLIC BLOOD PRESSURE: 77 MMHG | HEIGHT: 63 IN | OXYGEN SATURATION: 97 %

## 2020-05-25 DIAGNOSIS — R42 DIZZINESS: ICD-10-CM

## 2020-05-25 DIAGNOSIS — E66.3 OVER WEIGHT: ICD-10-CM

## 2020-05-25 DIAGNOSIS — R11.0 NAUSEA: ICD-10-CM

## 2020-05-25 DIAGNOSIS — G44.85 PRIMARY STABBING HEADACHE: ICD-10-CM
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
## 2020-05-25 DIAGNOSIS — S09.93XA BLUNT TRAUMA OF FACE, INITIAL ENCOUNTER: ICD-10-CM

## 2020-05-25 DIAGNOSIS — I10 ESSENTIAL HYPERTENSION: ICD-10-CM

## 2020-05-25 DIAGNOSIS — E78.2 MIXED HYPERLIPIDEMIA: ICD-10-CM
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
## 2020-05-25 PROCEDURE — 70450 CT HEAD WITHOUT CONTRAST: ICD-10-PCS | Mod: 26,,, | Performed by: RADIOLOGY

## 2020-05-25 PROCEDURE — 99214 PR OFFICE/OUTPT VISIT, EST, LEVL IV, 30-39 MIN: ICD-10-PCS | Mod: S$GLB,,, | Performed by: NURSE PRACTITIONER

## 2020-05-25 PROCEDURE — 70450 CT HEAD/BRAIN W/O DYE: CPT | Mod: TC

## 2020-05-25 PROCEDURE — 70486 CT MAXILLOFACIAL W/O DYE: CPT | Mod: 26,,, | Performed by: RADIOLOGY

## 2020-05-25 PROCEDURE — 3008F BODY MASS INDEX DOCD: CPT | Mod: CPTII,S$GLB,, | Performed by: NURSE PRACTITIONER

## 2020-05-25 PROCEDURE — 3078F DIAST BP <80 MM HG: CPT | Mod: CPTII,S$GLB,, | Performed by: NURSE PRACTITIONER

## 2020-05-25 PROCEDURE — 70486 CT MAXILLOFACIAL WITHOUT CONTRAST: ICD-10-PCS | Mod: 26,,, | Performed by: RADIOLOGY

## 2020-05-25 PROCEDURE — 3008F PR BODY MASS INDEX (BMI) DOCUMENTED: ICD-10-PCS | Mod: CPTII,S$GLB,, | Performed by: NURSE PRACTITIONER

## 2020-05-25 PROCEDURE — 70486 CT MAXILLOFACIAL W/O DYE: CPT | Mod: TC

## 2020-05-25 PROCEDURE — 3078F PR MOST RECENT DIASTOLIC BLOOD PRESSURE < 80 MM HG: ICD-10-PCS | Mod: CPTII,S$GLB,, | Performed by: NURSE PRACTITIONER

## 2020-05-25 PROCEDURE — 99214 OFFICE O/P EST MOD 30 MIN: CPT | Mod: S$GLB,,, | Performed by: NURSE PRACTITIONER

## 2020-05-25 PROCEDURE — 70450 CT HEAD/BRAIN W/O DYE: CPT | Mod: 26,,, | Performed by: RADIOLOGY

## 2020-05-25 PROCEDURE — 3074F PR MOST RECENT SYSTOLIC BLOOD PRESSURE < 130 MM HG: ICD-10-PCS | Mod: CPTII,S$GLB,, | Performed by: NURSE PRACTITIONER

## 2020-05-25 PROCEDURE — 3074F SYST BP LT 130 MM HG: CPT | Mod: CPTII,S$GLB,, | Performed by: NURSE PRACTITIONER

## 2020-05-25 RX ORDER — OXYCODONE AND ACETAMINOPHEN 10; 325 MG/1; MG/1
1 TABLET ORAL EVERY 6 HOURS PRN
Qty: 21 TABLET | Refills: 0 | Status: SHIPPED | OUTPATIENT
Start: 2020-05-25 | End: 2020-07-09

## 2020-05-25 RX ORDER — TRAZODONE HYDROCHLORIDE 100 MG/1
100 TABLET ORAL NIGHTLY
Qty: 30 TABLET | Refills: 11 | Status: SHIPPED | OUTPATIENT
Start: 2020-05-25 | End: 2020-07-09

## 2020-05-25 RX ORDER — ATORVASTATIN CALCIUM 20 MG/1
20 TABLET, FILM COATED ORAL NIGHTLY
Qty: 90 TABLET | Refills: 1 | Status: SHIPPED | OUTPATIENT
Start: 2020-05-25 | End: 2021-04-21 | Stop reason: SDUPTHER

## 2020-05-25 RX ORDER — ATENOLOL 25 MG/1
25 TABLET ORAL DAILY
Qty: 90 TABLET | Refills: 1 | Status: SHIPPED | OUTPATIENT
Start: 2020-05-25

## 2020-05-25 RX ORDER — ONDANSETRON 4 MG/1
4 TABLET, FILM COATED ORAL EVERY 6 HOURS PRN
Qty: 30 TABLET | Refills: 5 | Status: SHIPPED | OUTPATIENT
Start: 2020-05-25 | End: 2020-07-09

## 2020-05-25 RX ORDER — SERTRALINE HYDROCHLORIDE 100 MG/1
100 TABLET, FILM COATED ORAL DAILY
COMMUNITY
Start: 2020-04-30 | End: 2020-05-25 | Stop reason: SDUPTHER

## 2020-05-25 RX ORDER — VALACYCLOVIR HYDROCHLORIDE 500 MG/1
TABLET, FILM COATED ORAL
Qty: 60 TABLET | Refills: 11 | Status: SHIPPED | OUTPATIENT
Start: 2020-05-25

## 2020-05-25 RX ORDER — LISINOPRIL AND HYDROCHLOROTHIAZIDE 10; 12.5 MG/1; MG/1
1 TABLET ORAL DAILY
Qty: 90 TABLET | Refills: 1 | Status: SHIPPED | OUTPATIENT
Start: 2020-05-25 | End: 2020-07-09

## 2020-05-25 RX ORDER — PHENTERMINE HYDROCHLORIDE 37.5 MG/1
37.5 TABLET ORAL
Qty: 30 TABLET | Refills: 0 | Status: SHIPPED | OUTPATIENT
Start: 2020-05-25 | End: 2020-06-24

## 2020-05-25 RX ORDER — SERTRALINE HYDROCHLORIDE 100 MG/1
100 TABLET, FILM COATED ORAL DAILY
Qty: 90 TABLET | Refills: 1 | Status: SHIPPED | OUTPATIENT
Start: 2020-05-25

## 2020-05-25 RX ORDER — LEVOTHYROXINE SODIUM 125 UG/1
125 TABLET ORAL
Qty: 90 TABLET | Refills: 1 | Status: SHIPPED | OUTPATIENT
Start: 2020-05-25

## 2020-05-25 RX ORDER — LEVONORGESTREL AND ETHINYL ESTRADIOL 0.15-0.03
1 KIT ORAL DAILY
Qty: 90 TABLET | Refills: 1 | Status: SHIPPED | OUTPATIENT
Start: 2020-05-25 | End: 2020-05-28

## 2020-05-25 NOTE — PROGRESS NOTES
Subjective:       Patient ID: Razia Avila is a 44 y.o. female.    Chief Complaint: Pain (Possible kidney stones); Headache (bumped head on wed on the left side, bruised face w/ headache ); and Medication Refill (estradiol, setraline, zofran )    The patient is a 44-year-old female that presents today with complaints of left sided facial pain, headache, nausea and feels like her eyes are not focused since she sustained blunt force trauma to the left side of her face due to a fall 5 days ago.  Her dog abruptly pulled her to the ground.  The following day she fell off of a step ladder and hit the back of her head, symptoms have exacerbated.      She then reports passing a kidney stone 3 days ago with residual right flank discomfort.  She was seen at [facility] urgent care where they ran a full blood panel all labs within normal limits including TSH.    Obesity and BMI of 29 discussed with past medical history of hyperlipidemia and hypertension which makes her a candidate for Adipex.  She requests to try Adipex for a few months to obtain a BMI goal of 26-27.  Laws in monthly visits were discussed.  She denies a history of palpitations and home blood pressure is essentially controlled.    She complains of insomnia not relieved with over-the-counter products Benadryl and melatonin request to resume Restoril yet agreeable to try trazodone.     Past Medical History:   Diagnosis Date    Hyperlipidemia     Hypertension     Pancreatitis     Thyroid disease        Past Surgical History:   Procedure Laterality Date    LAPAROSCOPIC CHOLECYSTECTOMY N/A 2/20/2019    Procedure: CHOLECYSTECTOMY-LAPAROSCOPIC;  Surgeon: Chaz Rowley MD;  Location: [facility];  Service: General;  Laterality: N/A;        Social History     Socioeconomic History    Marital status: Single     Spouse name: Not on file    Number of children: Not on file    Years of education: Not on file    Highest education level: Not on file    Occupational History    Not on file   Social Needs    Financial resource strain: Somewhat hard    Food insecurity:     Worry: Not on file     Inability: Not on file    Transportation needs:     Medical: No     Non-medical: No   Tobacco Use    Smoking status: Never Smoker    Smokeless tobacco: Never Used   Substance and Sexual Activity    Alcohol use: Yes     Frequency: 2-3 times a week     Drinks per session: 3 or 4     Binge frequency: Less than monthly     Comment: OCCASIONAL SOCIAL DRINK    Drug use: No    Sexual activity: Yes   Lifestyle    Physical activity:     Days per week: 3 days     Minutes per session: 50 min    Stress: Rather much   Relationships    Social connections:     Talks on phone: Three times a week     Gets together: Three times a week     Attends [employer] service: Not on file     Active member of club or organization: No     Attends meetings of clubs or organizations: Patient refused     Relationship status:    Other Topics Concern    Not on file   Social History Narrative    Not on file       Family History   Problem Relation Age of Onset    Glaucoma Mother     Heart disease Father     Hyperlipidemia Father     Hypertension Father     Diabetes Brother        Review of patient's allergies indicates:  No Known Allergies       Current Outpatient Medications:     aspirin (ECOTRIN) 81 MG EC tablet, Take 81 mg by mouth once daily., Disp: , Rfl:     atenoloL (TENORMIN) 25 MG tablet, Take 1 tablet (25 mg total) by mouth once daily., Disp: 90 tablet, Rfl: 1    atorvastatin (LIPITOR) 20 MG tablet, Take 1 tablet (20 mg total) by mouth every evening., Disp: 90 tablet, Rfl: 1    levonorgestrel-ethinyl estradiol (SEASONALE) 0.15 mg-30 mcg (91) per tablet, Take 1 tablet by mouth once daily., Disp: 90 tablet, Rfl: 1    levothyroxine (SYNTHROID) 125 MCG tablet, Take 1 tablet (125 mcg total) by mouth before breakfast., Disp: 90 tablet, Rfl: 1    lisinopriL-hydrochlorothiazide  (PRINZIDE,ZESTORETIC) 10-12.5 mg per tablet, Take 1 tablet by mouth once daily., Disp: 90 tablet, Rfl: 1    ondansetron (ZOFRAN) 4 MG tablet, Take 1 tablet (4 mg total) by mouth every 6 (six) hours as needed., Disp: 30 tablet, Rfl: 5    sertraline (ZOLOFT) 100 MG tablet, Take 1 tablet (100 mg total) by mouth once daily., Disp: 90 tablet, Rfl: 1    valACYclovir (VALTREX) 500 MG tablet, 500mg q12 hours x  3 days for recurrence., Disp: 60 tablet, Rfl: 11    oxyCODONE-acetaminophen (PERCOCET)  mg per tablet, Take 1 tablet by mouth every 6 (six) hours as needed for Pain., Disp: 21 tablet, Rfl: 0    phentermine (ADIPEX-P) 37.5 mg tablet, Take 1 tablet (37.5 mg total) by mouth before breakfast., Disp: 30 tablet, Rfl: 0    temazepam (RESTORIL) 15 mg Cap, Take 1 capsule (15 mg total) by mouth nightly as needed. (Patient not taking: Reported on 5/25/2020), Disp: 30 capsule, Rfl: 5    traZODone (DESYREL) 100 MG tablet, Take 1 tablet (100 mg total) by mouth every evening., Disp: 30 tablet, Rfl: 11    HPI  Review of Systems   Constitutional: Negative.    HENT: Negative.         Facial pain and bruising since fall   Eyes: Negative.    Respiratory: Negative.    Cardiovascular: Negative.    Gastrointestinal: Positive for nausea.   Endocrine: Negative.    Genitourinary: Negative.    Musculoskeletal: Negative.    Skin: Negative.    Allergic/Immunologic: Negative.    Neurological: Positive for dizziness and headaches.   Hematological: Negative.    Psychiatric/Behavioral: Negative.        Objective:      Physical Exam   Constitutional: She is oriented to person, place, and time. She appears well-developed and well-nourished.   HENT:   Head: Normocephalic.   Eyes: Pupils are equal, round, and reactive to light. Conjunctivae are normal.   Neck: Normal range of motion. Neck supple. No thyromegaly present.   Cardiovascular: Normal rate, regular rhythm and normal heart sounds.   No murmur heard.  Pulmonary/Chest: Effort normal  and breath sounds normal. She has no wheezes. She has no rales.   Abdominal: Soft. Bowel sounds are normal. She exhibits no distension. There is no tenderness.   Musculoskeletal: Normal range of motion. She exhibits no edema.   Neurological: She is alert and oriented to person, place, and time.   Skin: Skin is warm and dry. Capillary refill takes less than 2 seconds.   Left black eye,  left sided facial bruising, swelling and left knee abrasion    Psychiatric: She has a normal mood and affect. Her behavior is normal. Judgment and thought content normal.   Vitals reviewed.      Assessment:       1. Fall, sequela    2. Dizziness    3. Primary stabbing headache    4. Nausea    5. Over weight    6. Mixed hyperlipidemia    7. Essential hypertension    8. Blunt trauma of face, initial encounter        Plan:       1. Maxillofacial and brain CT w/o today  2. Percocet for pain  3. adipex for weight loss  4. All medications refilled  5. RTC 4 weeks for weight check    Fall, sequela  -     X-Ray Facial Bones  3 Or More View; Future; Expected date: 05/25/2020  -     CT Head Without Contrast; Future; Expected date: 05/25/2020  -     oxyCODONE-acetaminophen (PERCOCET)  mg per tablet; Take 1 tablet by mouth every 6 (six) hours as needed for Pain.  Dispense: 21 tablet; Refill: 0  -     Cancel: CT Maxillofacial W Wo Contrast; Future; Expected date: 05/25/2020  -     CT Maxillofacial Without Contrast; Future; Expected date: 05/25/2020    Dizziness  -     X-Ray Facial Bones  3 Or More View; Future; Expected date: 05/25/2020  -     CT Head Without Contrast; Future; Expected date: 05/25/2020  -     oxyCODONE-acetaminophen (PERCOCET)  mg per tablet; Take 1 tablet by mouth every 6 (six) hours as needed for Pain.  Dispense: 21 tablet; Refill: 0  -     CT Maxillofacial Without Contrast; Future; Expected date: 05/25/2020    Primary stabbing headache  -     X-Ray Facial Bones  3 Or More View; Future; Expected date: 05/25/2020  -      CT Head Without Contrast; Future; Expected date: 05/25/2020  -     oxyCODONE-acetaminophen (PERCOCET)  mg per tablet; Take 1 tablet by mouth every 6 (six) hours as needed for Pain.  Dispense: 21 tablet; Refill: 0  -     CT Maxillofacial Without Contrast; Future; Expected date: 05/25/2020    Nausea  -     X-Ray Facial Bones  3 Or More View; Future; Expected date: 05/25/2020  -     CT Head Without Contrast; Future; Expected date: 05/25/2020  -     ondansetron (ZOFRAN) 4 MG tablet; Take 1 tablet (4 mg total) by mouth every 6 (six) hours as needed.  Dispense: 30 tablet; Refill: 5    Over weight  -     phentermine (ADIPEX-P) 37.5 mg tablet; Take 1 tablet (37.5 mg total) by mouth before breakfast.  Dispense: 30 tablet; Refill: 0    Mixed hyperlipidemia  -     atorvastatin (LIPITOR) 20 MG tablet; Take 1 tablet (20 mg total) by mouth every evening.  Dispense: 90 tablet; Refill: 1    Essential hypertension  -     atenoloL (TENORMIN) 25 MG tablet; Take 1 tablet (25 mg total) by mouth once daily.  Dispense: 90 tablet; Refill: 1  -     lisinopriL-hydrochlorothiazide (PRINZIDE,ZESTORETIC) 10-12.5 mg per tablet; Take 1 tablet by mouth once daily.  Dispense: 90 tablet; Refill: 1    Blunt trauma of face, initial encounter  -     Cancel: CT Maxillofacial W Wo Contrast; Future; Expected date: 05/25/2020  -     CT Maxillofacial Without Contrast; Future; Expected date: 05/25/2020    Other orders  -     levonorgestrel-ethinyl estradiol (SEASONALE) 0.15 mg-30 mcg (91) per tablet; Take 1 tablet by mouth once daily.  Dispense: 90 tablet; Refill: 1  -     valACYclovir (VALTREX) 500 MG tablet; 500mg q12 hours x  3 days for recurrence.  Dispense: 60 tablet; Refill: 11  -     sertraline (ZOLOFT) 100 MG tablet; Take 1 tablet (100 mg total) by mouth once daily.  Dispense: 90 tablet; Refill: 1  -     levothyroxine (SYNTHROID) 125 MCG tablet; Take 1 tablet (125 mcg total) by mouth before breakfast.  Dispense: 90 tablet; Refill: 1  -      traZODone (DESYREL) 100 MG tablet; Take 1 tablet (100 mg total) by mouth every evening.  Dispense: 30 tablet; Refill: 11        Risks, benefits, and side effects were discussed with the patient. All questions were answered to the fullest satisfaction of the patient, and pt verbalized understanding and agreement to treatment plan. Pt was to call with any new or worsening symptoms, or present to the ER.

## 2020-05-25 NOTE — TELEPHONE ENCOUNTER
----- Message from RT Troy sent at 5/25/2020 10:18 AM CDT -----  Hi, we have this patient on our schedule for CT of the head and xrays of the facial bones. Our radiologist prefers and protocols any head images to be done through CT. If this is okay with you, we need a new order for the facial bones to go to CT. The images are higher quality and it makes for a more comfortable study for the patient

## 2020-05-26 ENCOUNTER — PATIENT MESSAGE (OUTPATIENT)
Dept: FAMILY MEDICINE | Facility: CLINIC | Age: 45
End: 2020-05-26

## 2020-05-26 ENCOUNTER — TELEPHONE (OUTPATIENT)
Dept: FAMILY MEDICINE | Facility: CLINIC | Age: 45
End: 2020-05-26

## 2020-05-26 NOTE — TELEPHONE ENCOUNTER
06/02/2020 Spoke with Pre Service Rep with [facility]. Patient's test was scheduled on 05/25/2020, a holiday. She stated all our providers in [place] are not in the system so when we order it doesn't automatically show the Pre Service Rep. They have to go in manually, and this day [facility] was closed due to holiday and unable to pre-authorize. [facility] doesn't accept retro claims.           Patient It looks like the patient has already had the test done on 05/25/2020. I will call tomorrow to find out the problem.        ----- Message from Edin Choi sent at 5/26/2020 12:38 PM CDT -----  Contact: pt  Type:  Test Results    Who Called:  pt  Name of Test (Lab/Mammo/Etc):  CT NON CONTRAST [[id number]]  Date of Test:  05/25/2020  Ordering Provider:  NATHALY ROGERS  Where the test was performed:  [facility]  Best Call Back Number:  [phone]  Additional Information:

## 2020-05-26 NOTE — TELEPHONE ENCOUNTER
Please call the [facility] Pre-Service department at [phone] and reference referral ID [id number] due to provider received notice insurance denied CT brain and face from yesterday.     Patient had study done yesterday and should have been notified prior to the studies as they were not ordered stat.    Please ask them what do we do now and who contacts the patient? Does the patient need to do anything?

## 2020-06-15 ENCOUNTER — PATIENT MESSAGE (OUTPATIENT)
Dept: ADMINISTRATIVE | Facility: HOSPITAL | Age: 45
End: 2020-06-15

## 2020-06-15 ENCOUNTER — PATIENT OUTREACH (OUTPATIENT)
Dept: ADMINISTRATIVE | Facility: HOSPITAL | Age: 45
End: 2020-06-15

## 2020-06-18 DIAGNOSIS — I10 ESSENTIAL HYPERTENSION: ICD-10-CM

## 2020-07-09 ENCOUNTER — OFFICE VISIT (OUTPATIENT)
Dept: FAMILY MEDICINE | Facility: CLINIC | Age: 45
End: 2020-07-09
Payer: COMMERCIAL

## 2020-07-09 VITALS
SYSTOLIC BLOOD PRESSURE: 118 MMHG | HEIGHT: 63 IN | WEIGHT: 163 LBS | HEART RATE: 77 BPM | BODY MASS INDEX: 28.88 KG/M2 | RESPIRATION RATE: 16 BRPM | DIASTOLIC BLOOD PRESSURE: 75 MMHG | TEMPERATURE: 98 F | OXYGEN SATURATION: 97 %

## 2020-07-09 DIAGNOSIS — E66.3 OVER WEIGHT: Primary | ICD-10-CM

## 2020-07-09 DIAGNOSIS — F51.01 PRIMARY INSOMNIA: ICD-10-CM

## 2020-07-09 DIAGNOSIS — Z12.31 BREAST CANCER SCREENING BY MAMMOGRAM: ICD-10-CM

## 2020-07-09 DIAGNOSIS — Z11.4 ENCOUNTER FOR SCREENING FOR HUMAN IMMUNODEFICIENCY VIRUS (HIV): ICD-10-CM

## 2020-07-09 DIAGNOSIS — Z23 NEED FOR TD VACCINE: ICD-10-CM

## 2020-07-09 DIAGNOSIS — Z01.818 TUBAL LIGATION EVALUATION: ICD-10-CM

## 2020-07-09 PROCEDURE — 3074F SYST BP LT 130 MM HG: CPT | Mod: CPTII,S$GLB,, | Performed by: NURSE PRACTITIONER

## 2020-07-09 PROCEDURE — 99214 OFFICE O/P EST MOD 30 MIN: CPT | Mod: 25,S$GLB,, | Performed by: NURSE PRACTITIONER

## 2020-07-09 PROCEDURE — 90714 TD VACCINE GREATER THAN OR EQUAL TO 7YO WITH PRESERVATIVE IM: ICD-10-PCS | Mod: S$GLB,,, | Performed by: NURSE PRACTITIONER

## 2020-07-09 PROCEDURE — 3078F DIAST BP <80 MM HG: CPT | Mod: CPTII,S$GLB,, | Performed by: NURSE PRACTITIONER

## 2020-07-09 PROCEDURE — 3074F PR MOST RECENT SYSTOLIC BLOOD PRESSURE < 130 MM HG: ICD-10-PCS | Mod: CPTII,S$GLB,, | Performed by: NURSE PRACTITIONER

## 2020-07-09 PROCEDURE — 3078F PR MOST RECENT DIASTOLIC BLOOD PRESSURE < 80 MM HG: ICD-10-PCS | Mod: CPTII,S$GLB,, | Performed by: NURSE PRACTITIONER

## 2020-07-09 PROCEDURE — 99214 PR OFFICE/OUTPT VISIT, EST, LEVL IV, 30-39 MIN: ICD-10-PCS | Mod: 25,S$GLB,, | Performed by: NURSE PRACTITIONER

## 2020-07-09 PROCEDURE — 90471 TD VACCINE GREATER THAN OR EQUAL TO 7YO WITH PRESERVATIVE IM: ICD-10-PCS | Mod: S$GLB,,, | Performed by: NURSE PRACTITIONER

## 2020-07-09 PROCEDURE — 3008F BODY MASS INDEX DOCD: CPT | Mod: CPTII,S$GLB,, | Performed by: NURSE PRACTITIONER

## 2020-07-09 PROCEDURE — 90471 IMMUNIZATION ADMIN: CPT | Mod: S$GLB,,, | Performed by: NURSE PRACTITIONER

## 2020-07-09 PROCEDURE — 3008F PR BODY MASS INDEX (BMI) DOCUMENTED: ICD-10-PCS | Mod: CPTII,S$GLB,, | Performed by: NURSE PRACTITIONER

## 2020-07-09 PROCEDURE — 90714 TD VACC NO PRESV 7 YRS+ IM: CPT | Mod: S$GLB,,, | Performed by: NURSE PRACTITIONER

## 2020-07-09 RX ORDER — TEMAZEPAM 15 MG/1
15 CAPSULE ORAL NIGHTLY PRN
Qty: 30 CAPSULE | Refills: 5 | Status: SHIPPED | OUTPATIENT
Start: 2020-07-09 | End: 2021-02-26 | Stop reason: SDUPTHER

## 2020-07-09 RX ORDER — PHENTERMINE HYDROCHLORIDE 37.5 MG/1
37.5 TABLET ORAL
Qty: 30 TABLET | Refills: 0 | Status: SHIPPED | OUTPATIENT
Start: 2020-07-09 | End: 2020-08-08

## 2020-07-09 RX ORDER — AZILSARTAN KAMEDOXOMIL AND CHLORTHALIDONE 40; 12.5 MG/1; MG/1
TABLET ORAL
COMMUNITY
End: 2020-07-09

## 2020-07-09 NOTE — PROGRESS NOTES
Subjective:       Patient ID: Razia Avila is a 44 y.o. female.    Chief Complaint: Follow-up (weight and weight loss medication. request referral to Gyn)    45 y/o female presents for 5 week follow up as she was prescribed Adipex. This scale only indicates a 1 pound loss yet encouraged to not be discouraged and to continue her exercise program and drinking a lot of water.      She is still taking birth control at 45 y/o. Risks discussed thus request referral to Gyn to discuss tubal ligation and other options.   Past Medical History:   Diagnosis Date    Hyperlipidemia     Hypertension     Pancreatitis     Thyroid disease        Past Surgical History:   Procedure Laterality Date    LAPAROSCOPIC CHOLECYSTECTOMY N/A 2/20/2019    Procedure: CHOLECYSTECTOMY-LAPAROSCOPIC;  Surgeon: Chaz Rowley MD;  Location: [facility] OR;  Service: General;  Laterality: N/A;        Social History     Socioeconomic History    Marital status: Single     Spouse name: Not on file    Number of children: Not on file    Years of education: Not on file    Highest education level: Not on file   Occupational History    Not on file   Social Needs    Financial resource strain: Somewhat hard    Food insecurity     Worry: Not on file     Inability: Not on file    Transportation needs     Medical: No     Non-medical: No   Tobacco Use    Smoking status: Never Smoker    Smokeless tobacco: Never Used   Substance and Sexual Activity    Alcohol use: Yes     Frequency: 2-3 times a week     Drinks per session: 3 or 4     Binge frequency: Less than monthly     Comment: OCCASIONAL SOCIAL DRINK    Drug use: No    Sexual activity: Yes   Lifestyle    Physical activity     Days per week: 3 days     Minutes per session: 50 min    Stress: Rather much   Relationships    Social connections     Talks on phone: Three times a week     Gets together: Three times a week     Attends Yars[employer] service: Not on file     Active member of club or  organization: No     Attends meetings of clubs or organizations: Patient refused     Relationship status:    Other Topics Concern    Not on file   Social History Narrative    Not on file       Family History   Problem Relation Age of Onset    Glaucoma Mother     Heart disease Father     Hyperlipidemia Father     Hypertension Father     Diabetes Brother        Review of patient's allergies indicates:  No Known Allergies       Current Outpatient Medications:     aspirin (ECOTRIN) 81 MG EC tablet, Take 81 mg by mouth once daily., Disp: , Rfl:     atenoloL (TENORMIN) 25 MG tablet, Take 1 tablet (25 mg total) by mouth once daily., Disp: 90 tablet, Rfl: 1    atorvastatin (LIPITOR) 20 MG tablet, Take 1 tablet (20 mg total) by mouth every evening., Disp: 90 tablet, Rfl: 1    azilsartan med-chlorthalidone (EDARBYCLOR) 40-12.5 mg Tab, Take 0.5 tablets by mouth once daily., Disp: , Rfl:     INTROVALE 0.15 mg-30 mcg (91) per tablet, TAKE 1 TABLET BY MOUTH EVERY DAY, Disp: 90 tablet, Rfl: 1    levothyroxine (SYNTHROID) 125 MCG tablet, Take 1 tablet (125 mcg total) by mouth before breakfast., Disp: 90 tablet, Rfl: 1    sertraline (ZOLOFT) 100 MG tablet, Take 1 tablet (100 mg total) by mouth once daily., Disp: 90 tablet, Rfl: 1    temazepam (RESTORIL) 15 mg Cap, Take 1 capsule (15 mg total) by mouth nightly as needed., Disp: 30 capsule, Rfl: 5    valACYclovir (VALTREX) 500 MG tablet, 500mg q12 hours x  3 days for recurrence., Disp: 60 tablet, Rfl: 11    phentermine (ADIPEX-P) 37.5 mg tablet, Take 1 tablet (37.5 mg total) by mouth before breakfast., Disp: 30 tablet, Rfl: 0    HPI  Review of Systems   Constitutional: Negative.  Negative for chills, diaphoresis, fever and unexpected weight change.   HENT: Negative.  Negative for dental problem and trouble swallowing.    Eyes: Negative.  Negative for visual disturbance.   Respiratory: Negative.  Negative for shortness of breath and wheezing.    Cardiovascular:  Negative.  Negative for chest pain and palpitations.   Gastrointestinal: Negative.  Negative for abdominal pain, constipation, diarrhea, nausea and vomiting.   Endocrine: Negative.  Negative for polydipsia and polyuria.   Genitourinary: Negative.  Negative for dysuria.   Musculoskeletal: Negative.  Negative for joint swelling.   Skin: Negative.  Negative for rash.   Allergic/Immunologic: Negative.    Neurological: Negative.  Negative for syncope and headaches.   Hematological: Negative.    Psychiatric/Behavioral: Negative.  Negative for agitation and confusion.       Objective:      Physical Exam  Vitals signs reviewed.   Constitutional:       Appearance: Normal appearance. She is well-developed. She is obese.   HENT:      Head: Normocephalic.   Eyes:      Conjunctiva/sclera: Conjunctivae normal.      Pupils: Pupils are equal, round, and reactive to light.   Neck:      Musculoskeletal: Normal range of motion and neck supple.      Thyroid: No thyromegaly.   Cardiovascular:      Rate and Rhythm: Normal rate and regular rhythm.      Heart sounds: Normal heart sounds. No murmur.   Pulmonary:      Effort: Pulmonary effort is normal.      Breath sounds: Normal breath sounds. No wheezing or rales.   Abdominal:      General: Bowel sounds are normal. There is no distension.      Palpations: Abdomen is soft.      Tenderness: There is no abdominal tenderness.   Musculoskeletal: Normal range of motion.   Skin:     General: Skin is warm and dry.      Capillary Refill: Capillary refill takes less than 2 seconds.   Neurological:      Mental Status: She is alert and oriented to person, place, and time.   Psychiatric:         Mood and Affect: Mood normal.         Behavior: Behavior normal.         Thought Content: Thought content normal.         Judgment: Judgment normal.         Assessment:       1. Over weight    2. Tubal ligation evaluation    3. Primary insomnia        Plan:       1- adipex sent to the pharmacy  2- refer to gyn  for birth control counseling and options d/t patients age and still taking birth control  Over weight  -     phentermine (ADIPEX-P) 37.5 mg tablet; Take 1 tablet (37.5 mg total) by mouth before breakfast.  Dispense: 30 tablet; Refill: 0    Tubal ligation evaluation  -     Ambulatory referral/consult to Obstetrics / Gynecology; Future; Expected date: 07/16/2020    Primary insomnia  -     temazepam (RESTORIL) 15 mg Cap; Take 1 capsule (15 mg total) by mouth nightly as needed.  Dispense: 30 capsule; Refill: 5        Risks, benefits, and side effects were discussed with the patient. All questions were answered to the fullest satisfaction of the patient, and pt verbalized understanding and agreement to treatment plan. Pt was to call with any new or worsening symptoms, or present to the ER.

## 2020-10-05 ENCOUNTER — PATIENT MESSAGE (OUTPATIENT)
Dept: ADMINISTRATIVE | Facility: HOSPITAL | Age: 45
End: 2020-10-05

## 2021-01-04 ENCOUNTER — PATIENT MESSAGE (OUTPATIENT)
Dept: ADMINISTRATIVE | Facility: HOSPITAL | Age: 46
End: 2021-01-04

## 2021-01-20 ENCOUNTER — TELEPHONE (OUTPATIENT)
Dept: FAMILY MEDICINE | Facility: CLINIC | Age: 46
End: 2021-01-20

## 2021-01-29 ENCOUNTER — OFFICE VISIT (OUTPATIENT)
Dept: FAMILY MEDICINE | Facility: CLINIC | Age: 46
End: 2021-01-29
Payer: COMMERCIAL

## 2021-01-29 VITALS
RESPIRATION RATE: 15 BRPM | HEIGHT: 63 IN | WEIGHT: 171.38 LBS | SYSTOLIC BLOOD PRESSURE: 113 MMHG | OXYGEN SATURATION: 96 % | BODY MASS INDEX: 30.37 KG/M2 | DIASTOLIC BLOOD PRESSURE: 75 MMHG | HEART RATE: 72 BPM | TEMPERATURE: 98 F

## 2021-01-29 DIAGNOSIS — K59.1 FUNCTIONAL DIARRHEA: ICD-10-CM

## 2021-01-29 DIAGNOSIS — Z23 FLU VACCINE NEED: ICD-10-CM

## 2021-01-29 DIAGNOSIS — Z11.4 ENCOUNTER FOR SCREENING FOR HUMAN IMMUNODEFICIENCY VIRUS (HIV): ICD-10-CM

## 2021-01-29 DIAGNOSIS — Z30.09 ENCOUNTER FOR OTHER GENERAL COUNSELING OR ADVICE ON CONTRACEPTION: ICD-10-CM

## 2021-01-29 DIAGNOSIS — E66.3 OVER WEIGHT: ICD-10-CM

## 2021-01-29 DIAGNOSIS — E78.2 MIXED HYPERLIPIDEMIA: ICD-10-CM

## 2021-01-29 DIAGNOSIS — Z11.59 NEED FOR HEPATITIS C SCREENING TEST: ICD-10-CM

## 2021-01-29 DIAGNOSIS — E03.9 HYPOTHYROIDISM (ACQUIRED): ICD-10-CM

## 2021-01-29 DIAGNOSIS — Z01.419 WELL WOMAN EXAM WITH ROUTINE GYNECOLOGICAL EXAM: Primary | ICD-10-CM

## 2021-01-29 DIAGNOSIS — Z01.419 ENCOUNTER FOR CERVICAL PAP SMEAR WITH PELVIC EXAM: ICD-10-CM

## 2021-01-29 PROCEDURE — 3008F BODY MASS INDEX DOCD: CPT | Mod: CPTII,S$GLB,, | Performed by: NURSE PRACTITIONER

## 2021-01-29 PROCEDURE — 3078F PR MOST RECENT DIASTOLIC BLOOD PRESSURE < 80 MM HG: ICD-10-PCS | Mod: CPTII,S$GLB,, | Performed by: NURSE PRACTITIONER

## 2021-01-29 PROCEDURE — 1126F PR PAIN SEVERITY QUANTIFIED, NO PAIN PRESENT: ICD-10-PCS | Mod: S$GLB,,, | Performed by: NURSE PRACTITIONER

## 2021-01-29 PROCEDURE — 90686 IIV4 VACC NO PRSV 0.5 ML IM: CPT | Mod: S$GLB,,, | Performed by: NURSE PRACTITIONER

## 2021-01-29 PROCEDURE — 99396 PREV VISIT EST AGE 40-64: CPT | Mod: 25,S$GLB,, | Performed by: NURSE PRACTITIONER

## 2021-01-29 PROCEDURE — 88175 CYTOPATH C/V AUTO FLUID REDO: CPT

## 2021-01-29 PROCEDURE — 99396 PR PREVENTIVE VISIT,EST,40-64: ICD-10-PCS | Mod: 25,S$GLB,, | Performed by: NURSE PRACTITIONER

## 2021-01-29 PROCEDURE — 90686 FLU VACCINE (QUAD) GREATER THAN OR EQUAL TO 3YO PRESERVATIVE FREE IM: ICD-10-PCS | Mod: S$GLB,,, | Performed by: NURSE PRACTITIONER

## 2021-01-29 PROCEDURE — 1126F AMNT PAIN NOTED NONE PRSNT: CPT | Mod: S$GLB,,, | Performed by: NURSE PRACTITIONER

## 2021-01-29 PROCEDURE — 3074F SYST BP LT 130 MM HG: CPT | Mod: CPTII,S$GLB,, | Performed by: NURSE PRACTITIONER

## 2021-01-29 PROCEDURE — 3008F PR BODY MASS INDEX (BMI) DOCUMENTED: ICD-10-PCS | Mod: CPTII,S$GLB,, | Performed by: NURSE PRACTITIONER

## 2021-01-29 PROCEDURE — 87624 HPV HI-RISK TYP POOLED RSLT: CPT

## 2021-01-29 PROCEDURE — 90471 IMMUNIZATION ADMIN: CPT | Mod: S$GLB,,, | Performed by: NURSE PRACTITIONER

## 2021-01-29 PROCEDURE — 3074F PR MOST RECENT SYSTOLIC BLOOD PRESSURE < 130 MM HG: ICD-10-PCS | Mod: CPTII,S$GLB,, | Performed by: NURSE PRACTITIONER

## 2021-01-29 PROCEDURE — 90471 FLU VACCINE (QUAD) GREATER THAN OR EQUAL TO 3YO PRESERVATIVE FREE IM: ICD-10-PCS | Mod: S$GLB,,, | Performed by: NURSE PRACTITIONER

## 2021-01-29 PROCEDURE — 3078F DIAST BP <80 MM HG: CPT | Mod: CPTII,S$GLB,, | Performed by: NURSE PRACTITIONER

## 2021-01-29 RX ORDER — MECLIZINE HYDROCHLORIDE 25 MG/1
25 TABLET ORAL 3 TIMES DAILY
COMMUNITY
Start: 2021-01-05

## 2021-01-29 RX ORDER — GABAPENTIN 300 MG/1
CAPSULE ORAL
COMMUNITY
Start: 2020-12-28

## 2021-01-29 RX ORDER — LEVONORGESTREL AND ETHINYL ESTRADIOL 0.15-0.03
1 KIT ORAL DAILY
Qty: 90 TABLET | Refills: 1 | Status: SHIPPED | OUTPATIENT
Start: 2021-01-29

## 2021-01-29 RX ORDER — METRONIDAZOLE 500 MG/1
TABLET ORAL
COMMUNITY
Start: 2020-12-23 | End: 2021-01-29

## 2021-01-29 RX ORDER — ETONOGESTREL AND ETHINYL ESTRADIOL VAGINAL RING .015; .12 MG/D; MG/D
1 RING VAGINAL
Qty: 3 EACH | Refills: 5 | Status: SHIPPED | OUTPATIENT
Start: 2021-01-29 | End: 2021-01-29

## 2021-01-29 RX ORDER — PHENTERMINE HYDROCHLORIDE 37.5 MG/1
37.5 TABLET ORAL
Qty: 30 TABLET | Refills: 0 | Status: SHIPPED | OUTPATIENT
Start: 2021-01-29 | End: 2021-02-28

## 2021-01-30 ENCOUNTER — LAB VISIT (OUTPATIENT)
Dept: LAB | Facility: HOSPITAL | Age: 46
End: 2021-01-30
Attending: NURSE PRACTITIONER
Payer: COMMERCIAL

## 2021-01-30 DIAGNOSIS — Z11.4 ENCOUNTER FOR SCREENING FOR HUMAN IMMUNODEFICIENCY VIRUS (HIV): ICD-10-CM

## 2021-01-30 DIAGNOSIS — E66.3 OVER WEIGHT: ICD-10-CM

## 2021-01-30 DIAGNOSIS — E03.9 HYPOTHYROIDISM (ACQUIRED): ICD-10-CM

## 2021-01-30 DIAGNOSIS — Z11.59 NEED FOR HEPATITIS C SCREENING TEST: ICD-10-CM

## 2021-01-30 DIAGNOSIS — Z01.419 WELL WOMAN EXAM WITH ROUTINE GYNECOLOGICAL EXAM: ICD-10-CM

## 2021-01-30 DIAGNOSIS — E78.2 MIXED HYPERLIPIDEMIA: ICD-10-CM

## 2021-01-30 LAB
ALBUMIN SERPL BCP-MCNC: 4.2 G/DL (ref 3.5–5.2)
ALP SERPL-CCNC: 99 U/L (ref 55–135)
ALT SERPL W/O P-5'-P-CCNC: 30 U/L (ref 10–44)
ANION GAP SERPL CALC-SCNC: 11 MMOL/L (ref 8–16)
AST SERPL-CCNC: 28 U/L (ref 10–40)
BASOPHILS # BLD AUTO: 0.04 K/UL (ref 0–0.2)
BASOPHILS NFR BLD: 0.5 % (ref 0–1.9)
BILIRUB SERPL-MCNC: 0.1 MG/DL (ref 0.1–1)
BUN SERPL-MCNC: 21 MG/DL (ref 6–20)
CALCIUM SERPL-MCNC: 9.3 MG/DL (ref 8.7–10.5)
CHLORIDE SERPL-SCNC: 106 MMOL/L (ref 95–110)
CHOLEST SERPL-MCNC: 182 MG/DL (ref 120–199)
CHOLEST/HDLC SERPL: 4 {RATIO} (ref 2–5)
CO2 SERPL-SCNC: 25 MMOL/L (ref 23–29)
CREAT SERPL-MCNC: 0.8 MG/DL (ref 0.5–1.4)
DIFFERENTIAL METHOD: ABNORMAL
EOSINOPHIL # BLD AUTO: 0.1 K/UL (ref 0–0.5)
EOSINOPHIL NFR BLD: 1.9 % (ref 0–8)
ERYTHROCYTE [DISTWIDTH] IN BLOOD BY AUTOMATED COUNT: 12.4 % (ref 11.5–14.5)
EST. GFR  (AFRICAN AMERICAN): >60 ML/MIN/1.73 M^2
EST. GFR  (NON AFRICAN AMERICAN): >60 ML/MIN/1.73 M^2
GLUCOSE SERPL-MCNC: 94 MG/DL (ref 70–110)
HCT VFR BLD AUTO: 37.1 % (ref 37–48.5)
HDLC SERPL-MCNC: 46 MG/DL (ref 40–75)
HDLC SERPL: 25.3 % (ref 20–50)
HGB BLD-MCNC: 12.4 G/DL (ref 12–16)
IMM GRANULOCYTES # BLD AUTO: 0.05 K/UL (ref 0–0.04)
IMM GRANULOCYTES NFR BLD AUTO: 0.7 % (ref 0–0.5)
LDLC SERPL CALC-MCNC: 68.2 MG/DL (ref 63–159)
LYMPHOCYTES # BLD AUTO: 2.3 K/UL (ref 1–4.8)
LYMPHOCYTES NFR BLD: 30.9 % (ref 18–48)
MCH RBC QN AUTO: 31.6 PG (ref 27–31)
MCHC RBC AUTO-ENTMCNC: 33.4 G/DL (ref 32–36)
MCV RBC AUTO: 95 FL (ref 82–98)
MONOCYTES # BLD AUTO: 0.6 K/UL (ref 0.3–1)
MONOCYTES NFR BLD: 8.1 % (ref 4–15)
NEUTROPHILS # BLD AUTO: 4.3 K/UL (ref 1.8–7.7)
NEUTROPHILS NFR BLD: 57.9 % (ref 38–73)
NONHDLC SERPL-MCNC: 136 MG/DL
NRBC BLD-RTO: 0 /100 WBC
PLATELET # BLD AUTO: 315 K/UL (ref 150–350)
PMV BLD AUTO: 9.7 FL (ref 9.2–12.9)
POTASSIUM SERPL-SCNC: 3.7 MMOL/L (ref 3.5–5.1)
PROT SERPL-MCNC: 7.6 G/DL (ref 6–8.4)
RBC # BLD AUTO: 3.92 M/UL (ref 4–5.4)
SODIUM SERPL-SCNC: 142 MMOL/L (ref 136–145)
T4 FREE SERPL-MCNC: 0.89 NG/DL (ref 0.71–1.51)
TRIGL SERPL-MCNC: 339 MG/DL (ref 30–150)
TSH SERPL DL<=0.005 MIU/L-ACNC: 1.24 UIU/ML (ref 0.34–5.6)
WBC # BLD AUTO: 7.5 K/UL (ref 3.9–12.7)

## 2021-01-30 PROCEDURE — 85025 COMPLETE CBC W/AUTO DIFF WBC: CPT

## 2021-01-30 PROCEDURE — 86703 HIV-1/HIV-2 1 RESULT ANTBDY: CPT

## 2021-01-30 PROCEDURE — 80061 LIPID PANEL: CPT

## 2021-01-30 PROCEDURE — 36415 COLL VENOUS BLD VENIPUNCTURE: CPT

## 2021-01-30 PROCEDURE — 84439 ASSAY OF FREE THYROXINE: CPT

## 2021-01-30 PROCEDURE — 84443 ASSAY THYROID STIM HORMONE: CPT

## 2021-01-30 PROCEDURE — 80053 COMPREHEN METABOLIC PANEL: CPT

## 2021-01-30 PROCEDURE — 86803 HEPATITIS C AB TEST: CPT

## 2021-02-01 ENCOUNTER — PATIENT MESSAGE (OUTPATIENT)
Dept: FAMILY MEDICINE | Facility: CLINIC | Age: 46
End: 2021-02-01

## 2021-02-01 LAB
HCV AB SERPL QL IA: NEGATIVE
HIV 1+2 AB+HIV1 P24 AG SERPL QL IA: NEGATIVE

## 2021-02-05 LAB
FINAL PATHOLOGIC DIAGNOSIS: NORMAL
Lab: NORMAL

## 2021-02-09 LAB
HPV HR 12 DNA SPEC QL NAA+PROBE: NEGATIVE
HPV16 AG SPEC QL: NEGATIVE
HPV18 DNA SPEC QL NAA+PROBE: NEGATIVE

## 2021-02-12 ENCOUNTER — PATIENT MESSAGE (OUTPATIENT)
Dept: FAMILY MEDICINE | Facility: CLINIC | Age: 46
End: 2021-02-12

## 2021-02-12 DIAGNOSIS — B96.89 BV (BACTERIAL VAGINOSIS): Primary | ICD-10-CM

## 2021-02-12 DIAGNOSIS — N76.0 BV (BACTERIAL VAGINOSIS): Primary | ICD-10-CM

## 2021-02-12 RX ORDER — METRONIDAZOLE 500 MG/1
500 TABLET ORAL EVERY 8 HOURS
Qty: 21 TABLET | Refills: 0 | Status: SHIPPED | OUTPATIENT
Start: 2021-02-12 | End: 2021-02-19

## 2021-02-26 DIAGNOSIS — E66.3 OVER WEIGHT: ICD-10-CM

## 2021-02-26 DIAGNOSIS — F51.01 PRIMARY INSOMNIA: ICD-10-CM

## 2021-02-26 RX ORDER — PHENTERMINE HYDROCHLORIDE 37.5 MG/1
37.5 TABLET ORAL
Qty: 30 TABLET | Refills: 0 | Status: CANCELLED | OUTPATIENT
Start: 2021-02-26 | End: 2021-03-28

## 2021-02-27 ENCOUNTER — PATIENT MESSAGE (OUTPATIENT)
Dept: FAMILY MEDICINE | Facility: CLINIC | Age: 46
End: 2021-02-27

## 2021-02-27 RX ORDER — TEMAZEPAM 15 MG/1
15 CAPSULE ORAL NIGHTLY PRN
Qty: 30 CAPSULE | Refills: 5 | Status: SHIPPED | OUTPATIENT
Start: 2021-02-27

## 2021-04-07 ENCOUNTER — PATIENT MESSAGE (OUTPATIENT)
Dept: ADMINISTRATIVE | Facility: HOSPITAL | Age: 46
End: 2021-04-07

## 2021-04-21 DIAGNOSIS — E78.2 MIXED HYPERLIPIDEMIA: ICD-10-CM

## 2021-04-21 RX ORDER — ATORVASTATIN CALCIUM 20 MG/1
20 TABLET, FILM COATED ORAL NIGHTLY
Qty: 90 TABLET | Refills: 1 | Status: SHIPPED | OUTPATIENT
Start: 2021-04-21

## 2021-09-15 DIAGNOSIS — Z12.31 OTHER SCREENING MAMMOGRAM: ICD-10-CM

## 2022-01-10 ENCOUNTER — PATIENT MESSAGE (OUTPATIENT)
Dept: ADMINISTRATIVE | Facility: HOSPITAL | Age: 47
End: 2022-01-10
Payer: COMMERCIAL

## 2022-01-25 ENCOUNTER — PATIENT MESSAGE (OUTPATIENT)
Dept: FAMILY MEDICINE | Facility: CLINIC | Age: 47
End: 2022-01-25
Payer: COMMERCIAL

## 2022-01-25 DIAGNOSIS — I10 ESSENTIAL HYPERTENSION: ICD-10-CM

## 2022-01-25 DIAGNOSIS — Z00.00 WELL ADULT EXAM: Primary | ICD-10-CM

## 2022-01-25 DIAGNOSIS — E03.9 ACQUIRED HYPOTHYROIDISM: ICD-10-CM

## 2022-01-25 DIAGNOSIS — E78.2 MIXED HYPERLIPIDEMIA: ICD-10-CM

## 2022-01-25 RX ORDER — ETONOGESTREL AND ETHINYL ESTRADIOL VAGINAL RING .015; .12 MG/D; MG/D
RING VAGINAL
Qty: 1 EACH | Refills: 0 | OUTPATIENT
Start: 2022-01-25

## 2022-02-03 RX ORDER — ETONOGESTREL AND ETHINYL ESTRADIOL VAGINAL RING .015; .12 MG/D; MG/D
RING VAGINAL
Refills: 4 | OUTPATIENT
Start: 2022-02-03

## 2022-02-09 ENCOUNTER — PATIENT OUTREACH (OUTPATIENT)
Dept: ADMINISTRATIVE | Facility: HOSPITAL | Age: 47
End: 2022-02-09
Payer: COMMERCIAL

## 2022-02-09 ENCOUNTER — PATIENT MESSAGE (OUTPATIENT)
Dept: ADMINISTRATIVE | Facility: HOSPITAL | Age: 47
End: 2022-02-09
Payer: COMMERCIAL

## 2022-02-09 NOTE — PROGRESS NOTES
Pre-visit Chart review notification  Records Received, hyper-linked into chart at this time.   The following record(s)  below were uploaded for Health Maintenance .       IMMUNIZATIONS

## 2022-02-09 NOTE — LETTER
February 17, 2022    Razia Avila  [address] [place] [address]             [facility]  [address]  [place] [address]  Phone: [phone] Dear Christy [facility] is committed to your overall health and would like to ensure that you are up to date on your recommended test and/or procedures.   Lidya Ramirez NP  has found that your chart shows you may be due for the following:       Health Maintenance Due   Topic Date Due    Mammogram  Never done    Colorectal Cancer Screening  Never done    Influenza Vaccine (1) 09/01/2021    COVID-19 Vaccine (3 - Booster for Pfizer series) 10/27/2021    Lipid Panel  01/30/2022      If you have had any of the above done at another facility, please let us know so that we may obtain copies from that facility.       If you have a copy of these records, please provide a copy for us to scan into your chart.  You are welcome to request that the report be faxed to us at  ([phone]).      If you have an upcoming scheduled appointment for the item above, please disregard this letter.        Saadia Carroll LPN  Performance Improvement Coordinator  [facility]  [address] [place] [address] [phone]

## 2022-02-17 NOTE — PROGRESS NOTES
"Attempted to outreach patient via "Rep", no answer after a week. Sending outreach via Mail Out Letter now.      "

## 2022-03-17 ENCOUNTER — PATIENT MESSAGE (OUTPATIENT)
Dept: ADMINISTRATIVE | Facility: HOSPITAL | Age: 47
End: 2022-03-17
Payer: COMMERCIAL

## 2022-03-17 ENCOUNTER — PATIENT OUTREACH (OUTPATIENT)
Dept: ADMINISTRATIVE | Facility: HOSPITAL | Age: 47
End: 2022-03-17
Payer: COMMERCIAL

## 2022-03-17 NOTE — LETTER
March 28, 2022    Razia Avila  [address] [place] [address]             [facility]  [address]  [facility] [place] [address]  Phone: [phone] Dear Christy [facility] is committed to your overall health and would like to ensure that you are up to date on your recommended test and/or procedures.   Lidya Ramirez NP  has found that your chart shows you may be due for the following:          Health Maintenance Due   Topic Date Due    Mammogram  Never done    Colorectal Cancer Screening  Never done    Influenza Vaccine (1) 09/01/2021    COVID-19 Vaccine (3 - Booster for Pfizer series) 09/27/2021    Lipid Panel  01/30/2022      If you have had any of the above done at another facility, please let us know so that we may obtain copies from that facility.       If you have a copy of these records, please provide a copy for us to scan into your chart.  You are welcome to request that the report be faxed to us at  ([phone]).      If you have an upcoming scheduled appointment for the item above, please disregard this letter.        Saadia Carroll LPN  Performance Improvement Coordinator  [facility]  [address] [place] [address] [phone]

## 2022-04-04 ENCOUNTER — PATIENT MESSAGE (OUTPATIENT)
Dept: ADMINISTRATIVE | Facility: HOSPITAL | Age: 47
End: 2022-04-04
Payer: COMMERCIAL

## 2022-05-31 ENCOUNTER — PATIENT MESSAGE (OUTPATIENT)
Dept: ADMINISTRATIVE | Facility: HOSPITAL | Age: 47
End: 2022-05-31
Payer: COMMERCIAL

## 2022-07-27 DIAGNOSIS — Z12.11 COLON CANCER SCREENING: ICD-10-CM

## 2022-08-01 ENCOUNTER — PATIENT MESSAGE (OUTPATIENT)
Dept: ADMINISTRATIVE | Facility: HOSPITAL | Age: 47
End: 2022-08-01
Payer: COMMERCIAL

## 2022-08-24 ENCOUNTER — PATIENT MESSAGE (OUTPATIENT)
Dept: ADMINISTRATIVE | Facility: HOSPITAL | Age: 47
End: 2022-08-24
Payer: COMMERCIAL

## 2022-10-11 ENCOUNTER — PATIENT MESSAGE (OUTPATIENT)
Dept: ADMINISTRATIVE | Facility: HOSPITAL | Age: 47
End: 2022-10-11
Payer: COMMERCIAL

## 2023-01-17 ENCOUNTER — PATIENT MESSAGE (OUTPATIENT)
Dept: ADMINISTRATIVE | Facility: HOSPITAL | Age: 48
End: 2023-01-17
Payer: COMMERCIAL

## 2024-01-12 ENCOUNTER — V-VISIT (OUTPATIENT)
Dept: URGENT CARE | Age: 49
End: 2024-01-12

## 2024-01-12 VITALS
WEIGHT: 138 LBS | HEART RATE: 80 BPM | DIASTOLIC BLOOD PRESSURE: 76 MMHG | RESPIRATION RATE: 16 BRPM | SYSTOLIC BLOOD PRESSURE: 114 MMHG | BODY MASS INDEX: 24.45 KG/M2 | HEIGHT: 63 IN | OXYGEN SATURATION: 99 % | TEMPERATURE: 96.8 F

## 2024-01-12 DIAGNOSIS — J02.9 SORE THROAT: ICD-10-CM

## 2024-01-12 DIAGNOSIS — J06.9 VIRAL URI WITH COUGH: Primary | ICD-10-CM

## 2024-01-12 LAB
FLUAV AG UPPER RESP QL IA.RAPID: NEGATIVE
FLUBV AG UPPER RESP QL IA.RAPID: NEGATIVE
INTERNAL PROCEDURAL CONTROLS ACCEPTABLE: YES
INTERNAL PROCEDURAL CONTROLS ACCEPTABLE: YES
S PYO AG THROAT QL IA.RAPID: NEGATIVE
SARS-COV+SARS-COV-2 AG RESP QL IA.RAPID: NOT DETECTED
TEST LOT EXPIRATION DATE: NORMAL
TEST LOT NUMBER: NORMAL

## 2024-01-12 PROCEDURE — 87804 INFLUENZA ASSAY W/OPTIC: CPT | Performed by: NURSE PRACTITIONER

## 2024-01-12 PROCEDURE — 87426 SARSCOV CORONAVIRUS AG IA: CPT | Performed by: NURSE PRACTITIONER

## 2024-01-12 PROCEDURE — 99203 OFFICE O/P NEW LOW 30 MIN: CPT | Performed by: NURSE PRACTITIONER

## 2024-01-12 PROCEDURE — 87880 STREP A ASSAY W/OPTIC: CPT | Performed by: NURSE PRACTITIONER

## 2025-08-04 ENCOUNTER — OFFICE VISIT (OUTPATIENT)
Dept: OCCUPATIONAL MEDICINE | Age: 50
End: 2025-08-04

## 2025-08-04 DIAGNOSIS — Z02.83 ENCOUNTER FOR DRUG SCREENING: Primary | ICD-10-CM

## 2025-08-04 PROCEDURE — OH035 DOT/N-DOT DS COLLECTION ONLY (ALL TYPES): Performed by: NURSE PRACTITIONER

## (undated) DEVICE — ELECTRODE REM PLYHSV RETURN 9

## (undated) DEVICE — SEE MEDLINE ITEM 156964

## (undated) DEVICE — FILTER LAPAROSCOPIC SMOKE EVAC

## (undated) DEVICE — CANNULA LAP SEAL Z THRD 5X100

## (undated) DEVICE — SUT CTD VICRYL 4-0 P-3 18IN

## (undated) DEVICE — SOL NACL IRR 3000ML

## (undated) DEVICE — TROCAR ENDO Z THREAD KII 5X100

## (undated) DEVICE — APPLIER CLIP ENDO LIGAMAX 5MM

## (undated) DEVICE — IRRIGATOR SUCTION W/TIP

## (undated) DEVICE — SUT 0 VICRYL / UR6 (J603)

## (undated) DEVICE — ADHESIVE DERMABOND ADVANCED

## (undated) DEVICE — GLOVE SURG ULTRA TOUCH 7

## (undated) DEVICE — SCISSOR TIP ENDOCUT DISPOSABLE

## (undated) DEVICE — CANISTER SUCTION 3000CC

## (undated) DEVICE — SLEEVE SCD EXPRESS CALF MEDIUM

## (undated) DEVICE — SOL CLEARIFY VISUALIZATION LAP

## (undated) DEVICE — TUBING HEATED INSUFFLATOR

## (undated) DEVICE — TROCAR KII BLLN 12MM 10CM

## (undated) DEVICE — SUT 3-0 VICRYL / SH (J416)

## (undated) DEVICE — GLOVE SURGEONS ULTRA TOUCH 6.5

## (undated) DEVICE — DRAPE ABDOMINAL TIBURON 14X11